# Patient Record
Sex: FEMALE | Race: WHITE | Employment: OTHER | URBAN - METROPOLITAN AREA
[De-identification: names, ages, dates, MRNs, and addresses within clinical notes are randomized per-mention and may not be internally consistent; named-entity substitution may affect disease eponyms.]

---

## 2017-07-26 ENCOUNTER — HOSPITAL ENCOUNTER (OUTPATIENT)
Dept: MAMMOGRAPHY | Age: 72
Discharge: HOME | End: 2017-07-26
Attending: INTERNAL MEDICINE
Payer: MEDICARE

## 2017-07-26 DIAGNOSIS — N63.10 LUMP OF RIGHT BREAST: ICD-10-CM

## 2017-07-26 DIAGNOSIS — N64.59 INVERTED NIPPLE: ICD-10-CM

## 2017-07-26 PROCEDURE — 76642 ULTRASOUND BREAST LIMITED: CPT

## 2017-07-26 PROCEDURE — 77066 DX MAMMO INCL CAD BI: CPT

## 2017-07-27 NOTE — PROGRESS NOTES
Please refer her urgently to Dr. Minnie George for breast biopsy due to 2.5 cm mass in right breast. Thanks.   Jessica Simpson

## 2017-07-31 ENCOUNTER — HOSPITAL ENCOUNTER (OUTPATIENT)
Dept: MAMMOGRAPHY | Age: 72
Discharge: HOME | End: 2017-07-31
Attending: INTERNAL MEDICINE
Payer: MEDICARE

## 2017-07-31 VITALS — DIASTOLIC BLOOD PRESSURE: 81 MMHG | HEART RATE: 80 BPM | SYSTOLIC BLOOD PRESSURE: 175 MMHG | TEMPERATURE: 96.5 F

## 2017-07-31 DIAGNOSIS — N63.10 BREAST MASS, RIGHT: ICD-10-CM

## 2017-07-31 PROCEDURE — 88374 M/PHMTRC ALYS ISHQUANT/SEMIQ: CPT

## 2017-07-31 PROCEDURE — 77065 DX MAMMO INCL CAD UNI: CPT

## 2017-07-31 PROCEDURE — 74011000250 HC RX REV CODE- 250: Performed by: INTERNAL MEDICINE

## 2017-07-31 PROCEDURE — 88305 TISSUE EXAM BY PATHOLOGIST: CPT | Performed by: INTERNAL MEDICINE

## 2017-07-31 PROCEDURE — 73060999999 HC MISC LAB CHARGE

## 2017-07-31 PROCEDURE — 19083 BX BREAST 1ST LESION US IMAG: CPT

## 2017-07-31 PROCEDURE — 88361 TUMOR IMMUNOHISTOCHEM/COMPUT: CPT

## 2017-07-31 RX ORDER — LIDOCAINE HYDROCHLORIDE 10 MG/ML
5 INJECTION INFILTRATION; PERINEURAL
Status: COMPLETED | OUTPATIENT
Start: 2017-07-31 | End: 2017-07-31

## 2017-07-31 RX ADMIN — LIDOCAINE HYDROCHLORIDE 5 ML: 10 INJECTION, SOLUTION INFILTRATION; PERINEURAL at 09:05

## 2017-08-02 ENCOUNTER — HOSPITAL ENCOUNTER (OUTPATIENT)
Dept: MRI IMAGING | Age: 72
Discharge: HOME | End: 2017-08-02
Attending: INTERNAL MEDICINE
Payer: MEDICARE

## 2017-08-02 DIAGNOSIS — C50.911 BREAST CANCER, RIGHT (HCC): ICD-10-CM

## 2017-08-02 PROCEDURE — 74011250636 HC RX REV CODE- 250/636: Performed by: INTERNAL MEDICINE

## 2017-08-02 PROCEDURE — 77059 MRI BREAST BI W WO CONT: CPT

## 2017-08-02 PROCEDURE — A9577 INJ MULTIHANCE: HCPCS | Performed by: INTERNAL MEDICINE

## 2017-08-02 RX ORDER — SODIUM CHLORIDE 0.9 % (FLUSH) 0.9 %
10 SYRINGE (ML) INJECTION
Status: COMPLETED | OUTPATIENT
Start: 2017-08-02 | End: 2017-08-02

## 2017-08-02 RX ADMIN — GADOBENATE DIMEGLUMINE 20 ML: 529 INJECTION, SOLUTION INTRAVENOUS at 13:00

## 2017-08-02 RX ADMIN — Medication 10 ML: at 13:00

## 2017-08-03 NOTE — PROGRESS NOTES
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 Baljit Thomas and I discussed the results and her next steps with the pt. She is having a bilateral breast MRI at 1230 and then will follow up with Dr. Aris Lopes on 2/4/15 at [id number]. The patient was very pleasant and had a lot of questions, she has a family member with a history of breast cancer so she dicussed the reasons for lumpectomy vs mastectomy more than usual. I assured her that the surgeon would go over those more in detail with her once she had her MRI results and receptors were back. The pt was given a new breast cancer pt packet of information that includes educational, emotional and spiritual support. The patient was given my contact information in case she has any further questions.

## 2017-08-09 PROBLEM — C50.511 BREAST CANCER OF LOWER-OUTER QUADRANT OF RIGHT FEMALE BREAST (HCC): Status: ACTIVE | Noted: 2017-08-09

## 2017-08-15 RX ORDER — CEFAZOLIN SODIUM IN 0.9 % NACL 2 G/50 ML
2 INTRAVENOUS SOLUTION, PIGGYBACK (ML) INTRAVENOUS ONCE
Status: CANCELLED | OUTPATIENT
Start: 2017-08-25 | End: 2017-08-25

## 2017-08-17 ENCOUNTER — HOSPITAL ENCOUNTER (OUTPATIENT)
Dept: SURGERY | Age: 72
Discharge: HOME | End: 2017-08-17
Attending: SURGERY

## 2017-08-17 VITALS
HEIGHT: 64 IN | BODY MASS INDEX: 41.48 KG/M2 | WEIGHT: 243 LBS | SYSTOLIC BLOOD PRESSURE: 169 MMHG | OXYGEN SATURATION: 94 % | TEMPERATURE: 96.6 F | DIASTOLIC BLOOD PRESSURE: 78 MMHG | HEART RATE: 66 BPM

## 2017-08-17 RX ORDER — GLUCOSAMINE/CHONDR SU A SOD 750-600 MG
1 TABLET ORAL DAILY
COMMUNITY
End: 2018-02-02 | Stop reason: ALTCHOICE

## 2017-08-18 RX ORDER — ASPIRIN 81 MG
1 TABLET, DELAYED RELEASE (ENTERIC COATED) ORAL
COMMUNITY

## 2017-08-24 ENCOUNTER — ANESTHESIA EVENT (OUTPATIENT)
Dept: SURGERY | Age: 72
End: 2017-08-24
Payer: MEDICARE

## 2017-08-25 ENCOUNTER — APPOINTMENT (OUTPATIENT)
Dept: MAMMOGRAPHY | Age: 72
End: 2017-08-25
Attending: SURGERY
Payer: MEDICARE

## 2017-08-25 ENCOUNTER — APPOINTMENT (OUTPATIENT)
Dept: NUCLEAR MEDICINE | Age: 72
End: 2017-08-25
Attending: SURGERY
Payer: MEDICARE

## 2017-08-25 ENCOUNTER — ANESTHESIA (OUTPATIENT)
Dept: SURGERY | Age: 72
End: 2017-08-25
Payer: MEDICARE

## 2017-08-25 ENCOUNTER — HOSPITAL ENCOUNTER (OUTPATIENT)
Age: 72
Setting detail: OUTPATIENT SURGERY
Discharge: HOME | End: 2017-08-25
Attending: SURGERY | Admitting: SURGERY
Payer: MEDICARE

## 2017-08-25 VITALS
WEIGHT: 243 LBS | RESPIRATION RATE: 16 BRPM | SYSTOLIC BLOOD PRESSURE: 145 MMHG | HEIGHT: 64 IN | TEMPERATURE: 97.4 F | BODY MASS INDEX: 41.48 KG/M2 | DIASTOLIC BLOOD PRESSURE: 68 MMHG | OXYGEN SATURATION: 94 % | HEART RATE: 90 BPM

## 2017-08-25 DIAGNOSIS — N63.10 BREAST MASS, RIGHT: ICD-10-CM

## 2017-08-25 DIAGNOSIS — C50.511 BREAST CANCER OF LOWER-OUTER QUADRANT OF RIGHT FEMALE BREAST (HCC): ICD-10-CM

## 2017-08-25 PROCEDURE — 76060000034 HC ANESTHESIA 1.5 TO 2 HR: Performed by: SURGERY

## 2017-08-25 PROCEDURE — 77030008703 HC TU ET UNCUF COVD -A

## 2017-08-25 PROCEDURE — 77030031139 HC SUT VCRL2 J&J -A: Performed by: SURGERY

## 2017-08-25 PROCEDURE — 74011250636 HC RX REV CODE- 250/636: Performed by: ANESTHESIOLOGY

## 2017-08-25 PROCEDURE — 77030003460 US PLC NDL/WIRE/CLIP/SEED BREAST RT

## 2017-08-25 PROCEDURE — 74011250636 HC RX REV CODE- 250/636

## 2017-08-25 PROCEDURE — 77065 DX MAMMO INCL CAD UNI: CPT

## 2017-08-25 PROCEDURE — 77030002933 HC SUT MCRYL J&J -A: Performed by: SURGERY

## 2017-08-25 PROCEDURE — 74011000250 HC RX REV CODE- 250: Performed by: SURGERY

## 2017-08-25 PROCEDURE — 76010000153 HC OR TIME 1.5 TO 2 HR: Performed by: SURGERY

## 2017-08-25 PROCEDURE — 74011000250 HC RX REV CODE- 250

## 2017-08-25 PROCEDURE — 74011250637 HC RX REV CODE- 250/637: Performed by: ANESTHESIOLOGY

## 2017-08-25 PROCEDURE — 77030018836 HC SOL IRR NACL ICUM -A: Performed by: SURGERY

## 2017-08-25 PROCEDURE — 76210000016 HC OR PH I REC 1 TO 1.5 HR: Performed by: SURGERY

## 2017-08-25 PROCEDURE — 77030019908 HC STETH ESOPH SIMS -A

## 2017-08-25 PROCEDURE — 88374 M/PHMTRC ALYS ISHQUANT/SEMIQ: CPT

## 2017-08-25 PROCEDURE — 77030002996 HC SUT SLK J&J -A: Performed by: SURGERY

## 2017-08-25 PROCEDURE — 74011250636 HC RX REV CODE- 250/636: Performed by: SURGERY

## 2017-08-25 PROCEDURE — 77030011265 HC ELECTRD BLD HEX COVD -A: Performed by: SURGERY

## 2017-08-25 PROCEDURE — 77030010514 HC APPL CLP LIG COVD -B: Performed by: SURGERY

## 2017-08-25 PROCEDURE — 88305 TISSUE EXAM BY PATHOLOGIST: CPT | Performed by: SURGERY

## 2017-08-25 PROCEDURE — A9541 TC99M SULFUR COLLOID: HCPCS

## 2017-08-25 PROCEDURE — 76210000020 HC REC RM PH II FIRST 0.5 HR: Performed by: SURGERY

## 2017-08-25 PROCEDURE — 77030010938 HC CLP LIG TELE -A: Performed by: SURGERY

## 2017-08-25 PROCEDURE — 88307 TISSUE EXAM BY PATHOLOGIST: CPT | Performed by: SURGERY

## 2017-08-25 PROCEDURE — 77030016570 HC BLNKT BAIR HGGR 3M -B

## 2017-08-25 PROCEDURE — 77030008477 HC STYL SATN SLP COVD -A

## 2017-08-25 PROCEDURE — 77030011640 HC PAD GRND REM COVD -A: Performed by: SURGERY

## 2017-08-25 RX ORDER — LIDOCAINE HYDROCHLORIDE 10 MG/ML
6 INJECTION INFILTRATION; PERINEURAL
Status: COMPLETED | OUTPATIENT
Start: 2017-08-25 | End: 2017-08-25

## 2017-08-25 RX ORDER — HYDRALAZINE HYDROCHLORIDE 20 MG/ML
INJECTION INTRAMUSCULAR; INTRAVENOUS AS NEEDED
Status: DISCONTINUED | OUTPATIENT
Start: 2017-08-25 | End: 2017-08-25 | Stop reason: HOSPADM

## 2017-08-25 RX ORDER — PROMETHAZINE HYDROCHLORIDE 25 MG/1
25 TABLET ORAL
Qty: 20 TAB | Refills: 0 | Status: SHIPPED | OUTPATIENT
Start: 2017-08-25 | End: 2018-02-02 | Stop reason: ALTCHOICE

## 2017-08-25 RX ORDER — SODIUM CHLORIDE, SODIUM LACTATE, POTASSIUM CHLORIDE, CALCIUM CHLORIDE 600; 310; 30; 20 MG/100ML; MG/100ML; MG/100ML; MG/100ML
75 INJECTION, SOLUTION INTRAVENOUS CONTINUOUS
Status: DISCONTINUED | OUTPATIENT
Start: 2017-08-25 | End: 2017-08-25 | Stop reason: HOSPADM

## 2017-08-25 RX ORDER — LIDOCAINE HYDROCHLORIDE 10 MG/ML
0.1 INJECTION INFILTRATION; PERINEURAL AS NEEDED
Status: DISCONTINUED | OUTPATIENT
Start: 2017-08-25 | End: 2017-08-25 | Stop reason: HOSPADM

## 2017-08-25 RX ORDER — LIDOCAINE HYDROCHLORIDE 10 MG/ML
INJECTION INFILTRATION; PERINEURAL AS NEEDED
Status: DISCONTINUED | OUTPATIENT
Start: 2017-08-25 | End: 2017-08-25 | Stop reason: HOSPADM

## 2017-08-25 RX ORDER — OXYCODONE HYDROCHLORIDE 5 MG/1
10 TABLET ORAL
Status: DISCONTINUED | OUTPATIENT
Start: 2017-08-25 | End: 2017-08-25 | Stop reason: HOSPADM

## 2017-08-25 RX ORDER — CEFAZOLIN SODIUM IN 0.9 % NACL 2 G/50 ML
2 INTRAVENOUS SOLUTION, PIGGYBACK (ML) INTRAVENOUS ONCE
Status: COMPLETED | OUTPATIENT
Start: 2017-08-25 | End: 2017-08-25

## 2017-08-25 RX ORDER — FENTANYL CITRATE 50 UG/ML
100 INJECTION, SOLUTION INTRAMUSCULAR; INTRAVENOUS ONCE
Status: DISCONTINUED | OUTPATIENT
Start: 2017-08-25 | End: 2017-08-25 | Stop reason: HOSPADM

## 2017-08-25 RX ORDER — SUCCINYLCHOLINE CHLORIDE 20 MG/ML
INJECTION INTRAMUSCULAR; INTRAVENOUS AS NEEDED
Status: DISCONTINUED | OUTPATIENT
Start: 2017-08-25 | End: 2017-08-25 | Stop reason: HOSPADM

## 2017-08-25 RX ORDER — GLYCOPYRROLATE 0.2 MG/ML
INJECTION INTRAMUSCULAR; INTRAVENOUS AS NEEDED
Status: DISCONTINUED | OUTPATIENT
Start: 2017-08-25 | End: 2017-08-25 | Stop reason: HOSPADM

## 2017-08-25 RX ORDER — MIDAZOLAM HYDROCHLORIDE 1 MG/ML
2 INJECTION, SOLUTION INTRAMUSCULAR; INTRAVENOUS ONCE
Status: COMPLETED | OUTPATIENT
Start: 2017-08-25 | End: 2017-08-25

## 2017-08-25 RX ORDER — OXYCODONE HYDROCHLORIDE 5 MG/1
5 TABLET ORAL
Status: DISCONTINUED | OUTPATIENT
Start: 2017-08-25 | End: 2017-08-25 | Stop reason: HOSPADM

## 2017-08-25 RX ORDER — OXYCODONE AND ACETAMINOPHEN 5; 325 MG/1; MG/1
TABLET ORAL
Qty: 24 TAB | Refills: 0 | Status: SHIPPED | OUTPATIENT
Start: 2017-08-25 | End: 2017-09-06

## 2017-08-25 RX ORDER — MIDAZOLAM HYDROCHLORIDE 1 MG/ML
2 INJECTION, SOLUTION INTRAMUSCULAR; INTRAVENOUS ONCE
Status: DISCONTINUED | OUTPATIENT
Start: 2017-08-25 | End: 2017-08-25 | Stop reason: HOSPADM

## 2017-08-25 RX ORDER — PROPOFOL 10 MG/ML
INJECTION, EMULSION INTRAVENOUS AS NEEDED
Status: DISCONTINUED | OUTPATIENT
Start: 2017-08-25 | End: 2017-08-25 | Stop reason: HOSPADM

## 2017-08-25 RX ORDER — LIDOCAINE HYDROCHLORIDE 20 MG/ML
INJECTION, SOLUTION EPIDURAL; INFILTRATION; INTRACAUDAL; PERINEURAL AS NEEDED
Status: DISCONTINUED | OUTPATIENT
Start: 2017-08-25 | End: 2017-08-25 | Stop reason: HOSPADM

## 2017-08-25 RX ORDER — FAMOTIDINE 20 MG/1
20 TABLET, FILM COATED ORAL ONCE
Status: COMPLETED | OUTPATIENT
Start: 2017-08-25 | End: 2017-08-25

## 2017-08-25 RX ORDER — HYDROMORPHONE HYDROCHLORIDE 2 MG/ML
0.5 INJECTION, SOLUTION INTRAMUSCULAR; INTRAVENOUS; SUBCUTANEOUS
Status: DISCONTINUED | OUTPATIENT
Start: 2017-08-25 | End: 2017-08-25 | Stop reason: HOSPADM

## 2017-08-25 RX ORDER — ROCURONIUM BROMIDE 10 MG/ML
INJECTION, SOLUTION INTRAVENOUS AS NEEDED
Status: DISCONTINUED | OUTPATIENT
Start: 2017-08-25 | End: 2017-08-25 | Stop reason: HOSPADM

## 2017-08-25 RX ORDER — FENTANYL CITRATE 50 UG/ML
INJECTION, SOLUTION INTRAMUSCULAR; INTRAVENOUS AS NEEDED
Status: DISCONTINUED | OUTPATIENT
Start: 2017-08-25 | End: 2017-08-25 | Stop reason: HOSPADM

## 2017-08-25 RX ADMIN — LIDOCAINE HYDROCHLORIDE 100 MG: 20 INJECTION, SOLUTION EPIDURAL; INFILTRATION; INTRACAUDAL; PERINEURAL at 12:11

## 2017-08-25 RX ADMIN — PROPOFOL 150 MG: 10 INJECTION, EMULSION INTRAVENOUS at 12:11

## 2017-08-25 RX ADMIN — FAMOTIDINE 20 MG: 20 TABLET ORAL at 10:00

## 2017-08-25 RX ADMIN — SUCCINYLCHOLINE CHLORIDE 160 MG: 20 INJECTION INTRAMUSCULAR; INTRAVENOUS at 12:11

## 2017-08-25 RX ADMIN — GLYCOPYRROLATE 0.2 MG: 0.2 INJECTION INTRAMUSCULAR; INTRAVENOUS at 12:35

## 2017-08-25 RX ADMIN — MIDAZOLAM HYDROCHLORIDE 2 MG: 1 INJECTION, SOLUTION INTRAMUSCULAR; INTRAVENOUS at 11:43

## 2017-08-25 RX ADMIN — FENTANYL CITRATE 25 MCG: 50 INJECTION, SOLUTION INTRAMUSCULAR; INTRAVENOUS at 13:14

## 2017-08-25 RX ADMIN — GLYCOPYRROLATE 0.2 MG: 0.2 INJECTION INTRAMUSCULAR; INTRAVENOUS at 12:38

## 2017-08-25 RX ADMIN — LIDOCAINE HYDROCHLORIDE 6 ML: 10 INJECTION, SOLUTION INFILTRATION; PERINEURAL at 10:50

## 2017-08-25 RX ADMIN — SODIUM CHLORIDE, SODIUM LACTATE, POTASSIUM CHLORIDE, AND CALCIUM CHLORIDE 75 ML/HR: 600; 310; 30; 20 INJECTION, SOLUTION INTRAVENOUS at 11:43

## 2017-08-25 RX ADMIN — FENTANYL CITRATE 100 MCG: 50 INJECTION, SOLUTION INTRAMUSCULAR; INTRAVENOUS at 12:07

## 2017-08-25 RX ADMIN — CEFAZOLIN 2 G: 1 INJECTION, POWDER, FOR SOLUTION INTRAMUSCULAR; INTRAVENOUS; PARENTERAL at 12:06

## 2017-08-25 RX ADMIN — ROCURONIUM BROMIDE 5 MG: 10 INJECTION, SOLUTION INTRAVENOUS at 12:11

## 2017-08-25 RX ADMIN — HYDRALAZINE HYDROCHLORIDE 10 MG: 20 INJECTION INTRAMUSCULAR; INTRAVENOUS at 13:35

## 2017-08-25 NOTE — ANESTHESIA POSTPROCEDURE EVALUATION
Post-Anesthesia Evaluation and Assessment    Patient: Linsey Trinidad MRN: [id number]  SSN: [id number]    YOB: 1945  Age: 67 y.o. Sex: female       Cardiovascular Function/Vital Signs  Visit Vitals    /77    Pulse 95    Temp 36.3 °C (97.4 °F)    Resp 16    Ht 5' 3.5\" (1.613 m)    Wt 110.2 kg (243 lb)    SpO2 91%    BMI 42.37 kg/m2       Patient is status post general anesthesia for Procedure(s):  RIGHT BREAST LUMPECTOMY  RIGHT BREAST NEEDLE LOCALIZED BIOPSY  SENTINEL NODE BIOPSY WITH LYMPHATIC MAPPING. Nausea/Vomiting: None    Postoperative hydration reviewed and adequate. Pain:  Pain Scale 1: Visual (08/25/17 1416)  Pain Intensity 1: 0 (08/25/17 1416)   Managed    Neurological Status:   Neuro (WDL): Within Defined Limits (08/25/17 1416)  Neuro  Neurologic State: Alert (08/25/17 1416)  Orientation Level: Oriented to person;Oriented to place (08/25/17 1416)  LUE Motor Response: Purposeful (08/25/17 1416)  LLE Motor Response: Purposeful (08/25/17 1416)  RUE Motor Response: Purposeful (08/25/17 1416)  RLE Motor Response: Purposeful (08/25/17 1416)   At baseline    Mental Status and Level of Consciousness: Arousable    Pulmonary Status:   O2 Device: Room air (08/25/17 1432)   Adequate oxygenation and airway patent    Complications related to anesthesia: None    Post-anesthesia assessment completed.  No concerns    Signed By: Dale Jackson MD     August 25, 2017

## 2017-08-25 NOTE — OP NOTES
[facility] 64   OPERATIVE REPORT       Name:  Everton Perez   MR#:  [id number]   :  1945   Account #:  [de-identified]   Date of Adm:  2017       DATE OF SURGERY: 2017    PREPROCEDURE DIAGNOSIS: Right breast cancer    POSTOPERATIVE DIAGNOSIS: Right breast cancer    PROCEDURES:    1. Right breast needle localized lumpectomy. 2. Right axillary sentinel lymph node biopsy with lymphatic   mapping. SURGEON: Lynda Perez MD.    ASSISTANT: None. ANESTHESIA: General anesthetic. ESTIMATED BLOOD LOSS: 25 mL. CONDITION AT COMPLETION: Stable. INDICATIONS: This patient is a 65-year-old white female with a   palpable mass at the lateral aspect of the nipple areolar   complex on the right side. The patient had complete workup   showing an approximately 2.5 cm mass with no evidence of   metastatic disease spread to the contralateral breast or   axillary node involvement. Risks, benefits, and alternatives to   surgical options were all clearly discussed with the patient. She understood the risks and wished to proceed and appropriate   consent was given. PROCEDURE: The patient underwent wire localization of the mass   prior to going to the operating room. She was then taken to the   OR and placed on the table in supine position. She underwent   general anesthetic with no difficulty. The breast was prepped   and draped in sterile fashion. IV antibiotics were given upon   induction. A time-out was performed identifying the patient,   procedure to be performed. The mass was palpable at approximately the 7 o'clock position   beneath the areola. An elliptical skin incision was made with a   #15 blade that included the majority of the nipple areolar   complex, along with the specimen. Electrocautery Bovie was then   used to take a generous specimen around the palpable mass at   least 1 cm margins in all directions.  Once removed, the specimen   was marked for orientation with a long suture lateral, short   suture superior, and a double suture in the deep margins. The   cavity was irrigated with warm saline and all fluid was   suctioned away. Hemostasis was assured. The incision was closed   in 2 layers with deep interrupted 3-0 Vicryl and subcuticular 4-  0 Monocryl. Mastisol and Steri-Strips were applied. Attention was turned to the axilla where the NeoProbe was used   to localize the sentinel lymph node. A small incision was made   directly overlying the increased signal from the probe and dissection down to it was performed   with both blunt and electrocautery dissection. The node was   identified and removed in its entirety and sent to Pathology for   review. No other nodes were found with increased uptake after   the removal. Again, the cavity was irrigated and closed in 2   layers with 3-0 Vicryl and 4-0 subcu Monocryl. Steri-Strips and   gauze dressings were applied. The patient tolerated the procedures well with no complications. She was awakened, extubated, and taken to recovery in good   condition.         MD Kenya Sellers / [place]   D:  08/25/2017   14:59   T:  08/25/2017   15:47   Job #:  [id number]

## 2017-08-25 NOTE — PERIOP NOTES
Patient unable to remove left ring finger ring, informed of risks including burn, loss of finger and other risks involved. Dr. Miguel Alpha informed of increased B/P of 204/105, no new orders given.

## 2017-08-25 NOTE — ANESTHESIA PREPROCEDURE EVALUATION
Anesthetic History     PONV          Review of Systems / Medical History      Pulmonary        Sleep apnea: CPAP           Neuro/Psych              Cardiovascular    Hypertension              Exercise tolerance: >4 METS     GI/Hepatic/Renal     GERD           Endo/Other        Morbid obesity     Other Findings              Physical Exam    Airway  Mallampati: II  TM Distance: > 6 cm  Neck ROM: normal range of motion   Mouth opening: Normal     Cardiovascular  Regular rate and rhythm,  S1 and S2 normal,  no murmur, click, rub, or gallop  Rhythm: regular  Rate: normal         Dental  No notable dental hx       Pulmonary  Breath sounds clear to auscultation               Abdominal         Other Findings            Anesthetic Plan    ASA: 3  Anesthesia type: general            Anesthetic plan and risks discussed with: Patient      AIMEE

## 2017-08-25 NOTE — IP AVS SNAPSHOT
[facility] 
 
 
 [address] [address] 
[phone] Patient: Roxi Figueredo MRN: [id number] ZDO:6/36/3119 You are allergic to the following Allergen Reactions Latex Rash Blisters Recent Documentation Height Weight BMI OB Status Smoking Status 1.613 m 110.2 kg 42.37 kg/m2 Hysterectomy Never Smoker Emergency Contacts Name Discharge Info Relation Home Work Mobile Koki Ramirez DISCHARGE CAREGIVER [3] Spouse [3]   [phone] About your hospitalization You were admitted on:  August 25, 2017 You last received care in the:  [facility] PACU You were discharged on:  August 25, 2017 Unit phone number:  [phone] Why you were hospitalized Your primary diagnosis was:  Not on File Providers Seen During Your Hospitalizations Provider Role Specialty Primary office phone Jayla Jon MD Attending Provider General Surgery [phone] Your Primary Care Physician (PCP) Primary Care Physician Office Phone Office Fax Ben Mater [address] [facility] Follow-up Information Follow up With Details Comments Contact Info Angelica Cohen, [address] [facility] [place] [facility] [address] 
[phone] Jayla Jon MD Schedule an appointment as soon as possible for a visit in 10 day(s)  [place] [address] [place] [address] [phone] [place] [address] 
[phone] Current Discharge Medication List  
  
START taking these medications Dose & Instructions Dispensing Information Comments Morning Noon Evening Bedtime  
 oxyCODONE-acetaminophen 5-325 mg per tablet Commonly known as:  PERCOCET Your last dose was: Your next dose is:    
   
   
 1-2 tabs by mouth every four hours prn pain Quantity:  24 Tab Refills:  0  
     
   
   
   
  
 promethazine 25 mg tablet Commonly known as:  PHENERGAN  
   
 Your last dose was: Your next dose is:    
   
   
 Dose:  25 mg Take 1 Tab by mouth every six (6) hours as needed for Nausea. Quantity:  20 Tab Refills:  0 CONTINUE these medications which have NOT CHANGED Dose & Instructions Dispensing Information Comments Morning Noon Evening Bedtime  
 aspirin 325 mg tablet Commonly known as:  ASPIRIN Your last dose was: Your next dose is:    
   
   
 Dose:  325 mg Take 325 mg by mouth as needed for Pain. Refills:  0 Biotin 2,500 mcg Cap Your last dose was: Your next dose is:    
   
   
 Dose:  1 Cap Take 1 Cap by mouth daily. Refills:  0  
     
   
   
   
  
 cpap machine kit Your last dose was: Your next dose is:    
   
   
 by Does Not Apply route. 15 cm Refills:  0  
     
   
   
   
  
 cranberry extract 450 mg Tab tablet Your last dose was: Your next dose is:    
   
   
 Dose:  450 mg Take 450 mg by mouth daily. Refills:  0 FLUoxetine 40 mg capsule Commonly known as:  PROzac Your last dose was: Your next dose is:    
   
   
 Dose:  40 mg Take 1 Cap by mouth daily. Quantity:  30 Cap Refills:  11  
     
   
   
   
  
 ibuprofen 200 mg tablet Commonly known as:  MOTRIN Your last dose was: Your next dose is:    
   
   
 Dose:  800 mg Take 800 mg by mouth every eight (8) hours as needed for Pain. Refills:  0  
     
   
   
   
  
 raNITIdine 150 mg tablet Commonly known as:  ZANTAC Your last dose was: Your next dose is:    
   
   
 Dose:  150 mg Take 150 mg by mouth two (2) times a day. Take / use AM day of surgery  per anesthesia protocols. Indications: gastroesophageal reflux disease Refills:  0 STOOL SOFTENER 100 mg Tab Generic drug:  docusate sodium Your last dose was: Your next dose is:    
   
   
 Dose:  1 Tab Take 1 Tab by mouth daily as needed. Indications: constipation Refills:  0 Where to Get Your Medications Information on where to get these meds will be given to you by the nurse or doctor. ! Ask your nurse or doctor about these medications  
  oxyCODONE-acetaminophen 5-325 mg per tablet  
 promethazine 25 mg tablet Discharge Instructions May shower on Sunday Lumpectomy: What to Expect at [facility] Your Recovery For 1 or 2 days after the surgery you will probably feel tired and have some pain. The skin around the cut (incision) may feel firm, swollen, and tender, and be bruised. Tenderness should go away in about 2 or 3 days, and the bruising within 2 weeks. Firmness and swelling may last for 3 to 6 months. You may feel a soft lump in your breast that gradually turns hard. This is the incision healing. It is not cancer. You should wear a well-fitted and supportive bra, even during the night, for 1 week. You will probably be able to go back to work or your normal routine in 1 to 3 weeks after the surgery. This may depend on whether you have more treatment. Your doctor may have removed some lymph nodes in your armpit to see if the cancer has spread. If so, you may feel either numbness or tingling (\"pins and needles\") in your armpit or on the inside of your upper arm. This should improve over the next several weeks. Some women have numbness for a longer time. When you find out that you have cancer, you may feel many emotions and may need some help coping. Seek out family, friends, and counselors for support. You also can do things at home to make yourself feel better while you go through treatment. Call the [employer] (0-741.591.9779) or visit its website at [employer] for more information.  
This care sheet gives you a general idea about how long it will take for you to recover. But each person recovers at a different pace. Follow the steps below to get better as quickly as possible. How can you care for yourself at home? Activity · Rest when you feel tired. Getting enough sleep will help you recover. You may want to sleep on the side that has not been operated on. Use a pillow to support the affected breast while lying on your side. · Avoid strenuous activities, such as biking, jogging, weightlifting, or aerobic exercise, for 1 month or until your doctor says it is okay. This may include housework, such as washing windows, especially if you have to use the arm next to the affected breast. 
· Most women can return to their normal activities within 2 weeks. · Try to walk each day. Start out by walking a little more than you did the day before. Bit by bit, increase the amount you walk. Walking boosts blood flow and helps prevent pneumonia and constipation. · For 1 to 2 weeks, avoid lifting anything over 10 to 15 pounds or that would make you strain. This may include heavy grocery bags and milk containers, a heavy briefcase or backpack, cat litter or dog food bags, a vacuum , or a child. · You may drive when you are no longer taking pain medicine and can use your arm without pain. Talk to your doctor about when to start driving, especially if you are having radiation treatments. · You will probably be able to go back to work or your normal routine in 1 to 3 weeks. It may be longer, depending on the type of work you do and whether you are having radiation or chemotherapy. · You may shower 24 to 48 hours after surgery, if your doctor okays it. Pat the incision dry. Do not take a bath for the first 2 weeks, or until your doctor tells you it is okay. Diet · You can eat your normal diet. If your stomach is upset, try bland, low-fat foods like plain rice, broiled chicken, toast, and yogurt.  
· You may notice that your bowel movements are not regular right after your surgery. This is common. Try to avoid constipation and straining with bowel movements. You may want to take a fiber supplement every day. If you have not had a bowel movement after a couple of days, ask your doctor about taking a mild laxative. Medicines · Your doctor will tell you if and when you can restart your medicines. He or she will also give you instructions about taking any new medicines. · If you take blood thinners, such as warfarin (Coumadin), clopidogrel (Plavix), or aspirin, be sure to talk to your doctor. He or she will tell you if and when to start taking those medicines again. Make sure that you understand exactly what your doctor wants you to do. · Take pain medicines exactly as directed. ¨ Your doctor may have given you a medicine to numb the area inside and around your cut (incision). The numbness will last from 6 to 12 hours. If you went home right after the surgery, you may want to take pain medicine before this wears off. ¨ If the doctor gave you a prescription medicine for pain, take it as prescribed. ¨ If you are not taking a prescription pain medicine, ask your doctor if you can take an over-the-counter medicine. · If your doctor prescribed antibiotics, take them as directed. Do not stop taking them just because you feel better. You need to take the full course of antibiotics. · If you think your pain medicine is making you sick to your stomach: 
¨ Take your medicine after meals (unless your doctor has told you not to). ¨ Ask your doctor for a different pain medicine. Incision care · If you have strips of tape on the cut the doctor made (incision), leave the tape on for a week or until it falls off. · When you can shower, wash the area daily with warm, soapy water and pat it dry. Follow-up care is a key part of your treatment and safety.  Be sure to make and go to all appointments, and call your doctor if you are having problems. It's also a good idea to know your test results and keep a list of the medicines you take. When should you call for help? Call 911 anytime you think you may need emergency care. For example, call if: 
· You passed out (lost consciousness). · You have severe trouble breathing. · You have sudden chest pain and shortness of breath, or you cough up blood. Call your doctor now or seek immediate medical care if: 
· You have pain that does not get better when you take your pain medicine. · You have signs of infection, such as: 
¨ Increased pain, swelling, warmth, or redness. ¨ Red streaks leading from the incision. ¨ Pus draining from the incision. ¨ A fever. · You have loose stitches or an open incision. · You have sudden swelling of your arm, hands, or fingers. · Bright red blood has soaked through the bandage over your incision. Watch closely for changes in your health, and be sure to contact your doctor if: 
· You see fluid leaking from either nipple. · Your swelling gets worse. · Your swelling is not going down. · You do not have a bowel movement after taking a laxative. Where can you learn more? Go to http://jorge l-eligio.info/. Enter D222 in the search box to learn more about \"Lumpectomy: What to Expect at Home. \" Current as of: September 22, 2016 Content Version: 11.3 © 0729-6123 [employer]. Care instructions adapted under license by [employer] (which disclaims liability or warranty for this information). If you have questions about a medical condition or this instruction, always ask your healthcare professional. [place] [address] any warranty or liability for your use of this information. Discharge Orders None ACO Transitions of Care Introducing [employer] [address] offers a voluntary care coordination program to provide high quality service and care to [facility] fee-for-service beneficiaries. Ke Gordon was designed to help you enhance your health and well-being through the following services: ? Transitions of Care  support for individuals who are transitioning from one care setting to another (example: Hospital to home). ? Chronic and Complex Care Coordination  support for individuals and caregivers of those with serious or chronic illnesses or with more than one chronic (ongoing) condition and those who take a number of different medications. If you meet specific medical criteria, a [facility] [address] may call you directly to coordinate your care with your primary care physician and your other care providers. For questions about the [facility] programs, please, contact your physicians office. For general questions or additional information about Accountable Care Organizations: 
Please visit www.medicare.gov/acos. html or call 1-800-MEDICARE (0-948.921.9934) TTY users should call 5-626.527.9873. Introducing [facility] HE[facility] SERVICES! Donny Teran introduces [email or web address] patient portal. Now you can access parts of your medical record, email your doctor's office, and request medication refills online. 1. In your internet browser, go to [email or web address] 2. Click on the First Time User? Click Here link in the Sign In box. You will see the New Member Sign Up page. 3. Enter your [email or web address] Access Code exactly as it appears below. You will not need to use this code after youve completed the sign-up process. If you do not sign up before the expiration date, you must request a new code. · [email or web address] Access Code: [email or web address] Expires: 10/19/2017  9:11 AM 
 
4. Enter the last four digits of your Social Security Number (xxxx) and Date of Birth (mm/dd/yyyy) as indicated and click Submit. You will be taken to the next sign-up page. 5. Create a [employer] ID. This will be your [employer] login ID and cannot be changed, so think of one that is secure and easy to remember. 6. Create a [employer] password. You can change your password at any time. 7. Enter your Password Reset Question and Answer. This can be used at a later time if you forget your password. 8. Enter your e-mail address. You will receive e-mail notification when new information is available in [address] 9. Click Sign Up. You can now view and download portions of your medical record. 10. Click the Download Summary menu link to download a portable copy of your medical information. If you have questions, please visit the Frequently Asked Questions section of the [employer] website. Remember, [employer] is NOT to be used for urgent needs. For medical emergencies, dial 911. Now available from your iPhone and Android! General Information Please provide this summary of care documentation to your next provider. Patient Signature:  ____________________________________________________________ Date:  ____________________________________________________________  
  
[facility] Provider Signature:  ____________________________________________________________ Date:  ____________________________________________________________

## 2017-08-25 NOTE — DISCHARGE INSTRUCTIONS
May shower on Sunday        Lumpectomy: What to Expect at [facility]    For 1 or 2 days after the surgery you will probably feel tired and have some pain. The skin around the cut (incision) may feel firm, swollen, and tender, and be bruised. Tenderness should go away in about 2 or 3 days, and the bruising within 2 weeks. Firmness and swelling may last for 3 to 6 months. You may feel a soft lump in your breast that gradually turns hard. This is the incision healing. It is not cancer. You should wear a well-fitted and supportive bra, even during the night, for 1 week. You will probably be able to go back to work or your normal routine in 1 to 3 weeks after the surgery. This may depend on whether you have more treatment. Your doctor may have removed some lymph nodes in your armpit to see if the cancer has spread. If so, you may feel either numbness or tingling (\"pins and needles\") in your armpit or on the inside of your upper arm. This should improve over the next several weeks. Some women have numbness for a longer time. When you find out that you have cancer, you may feel many emotions and may need some help coping. Seek out family, friends, and counselors for support. You also can do things at home to make yourself feel better while you go through treatment. Call the [facility] (3-551.275.2681) or visit its website at [employer] for more information. This care sheet gives you a general idea about how long it will take for you to recover. But each person recovers at a different pace. Follow the steps below to get better as quickly as possible. How can you care for yourself at home? Activity  · Rest when you feel tired. Getting enough sleep will help you recover. You may want to sleep on the side that has not been operated on. Use a pillow to support the affected breast while lying on your side.   · Avoid strenuous activities, such as biking, jogging, weightlifting, or aerobic exercise, for 1 month or until your doctor says it is okay. This may include housework, such as washing windows, especially if you have to use the arm next to the affected breast.  · Most women can return to their normal activities within 2 weeks. · Try to walk each day. Start out by walking a little more than you did the day before. Bit by bit, increase the amount you walk. Walking boosts blood flow and helps prevent pneumonia and constipation. · For 1 to 2 weeks, avoid lifting anything over 10 to 15 pounds or that would make you strain. This may include heavy grocery bags and milk containers, a heavy briefcase or backpack, cat litter or dog food bags, a vacuum , or a child. · You may drive when you are no longer taking pain medicine and can use your arm without pain. Talk to your doctor about when to start driving, especially if you are having radiation treatments. · You will probably be able to go back to work or your normal routine in 1 to 3 weeks. It may be longer, depending on the type of work you do and whether you are having radiation or chemotherapy. · You may shower 24 to 48 hours after surgery, if your doctor okays it. Pat the incision dry. Do not take a bath for the first 2 weeks, or until your doctor tells you it is okay. Diet  · You can eat your normal diet. If your stomach is upset, try bland, low-fat foods like plain rice, broiled chicken, toast, and yogurt. · You may notice that your bowel movements are not regular right after your surgery. This is common. Try to avoid constipation and straining with bowel movements. You may want to take a fiber supplement every day. If you have not had a bowel movement after a couple of days, ask your doctor about taking a mild laxative. Medicines  · Your doctor will tell you if and when you can restart your medicines. He or she will also give you instructions about taking any new medicines.   · If you take blood thinners, such as warfarin (Coumadin), clopidogrel (Plavix), or aspirin, be sure to talk to your doctor. He or she will tell you if and when to start taking those medicines again. Make sure that you understand exactly what your doctor wants you to do. · Take pain medicines exactly as directed. ¨ Your doctor may have given you a medicine to numb the area inside and around your cut (incision). The numbness will last from 6 to 12 hours. If you went home right after the surgery, you may want to take pain medicine before this wears off. ¨ If the doctor gave you a prescription medicine for pain, take it as prescribed. ¨ If you are not taking a prescription pain medicine, ask your doctor if you can take an over-the-counter medicine. · If your doctor prescribed antibiotics, take them as directed. Do not stop taking them just because you feel better. You need to take the full course of antibiotics. · If you think your pain medicine is making you sick to your stomach:  ¨ Take your medicine after meals (unless your doctor has told you not to). ¨ Ask your doctor for a different pain medicine. Incision care  · If you have strips of tape on the cut the doctor made (incision), leave the tape on for a week or until it falls off. · When you can shower, wash the area daily with warm, soapy water and pat it dry. Follow-up care is a key part of your treatment and safety. Be sure to make and go to all appointments, and call your doctor if you are having problems. It's also a good idea to know your test results and keep a list of the medicines you take. When should you call for help? Call 911 anytime you think you may need emergency care. For example, call if:  · You passed out (lost consciousness). · You have severe trouble breathing. · You have sudden chest pain and shortness of breath, or you cough up blood. Call your doctor now or seek immediate medical care if:  · You have pain that does not get better when you take your pain medicine.   · You have signs of infection, such as:  ¨ Increased pain, swelling, warmth, or redness. ¨ Red streaks leading from the incision. ¨ Pus draining from the incision. ¨ A fever. · You have loose stitches or an open incision. · You have sudden swelling of your arm, hands, or fingers. · Bright red blood has soaked through the bandage over your incision. Watch closely for changes in your health, and be sure to contact your doctor if:  · You see fluid leaking from either nipple. · Your swelling gets worse. · Your swelling is not going down. · You do not have a bowel movement after taking a laxative. Where can you learn more? Go to http://jorge l-eligio.info/. Enter D222 in the search box to learn more about \"Lumpectomy: What to Expect at Home. \"  Current as of: September 22, 2016  Content Version: 11.3  © 3190-0721 [email or web address]. Care instructions adapted under license by [email or web address] (which disclaims liability or warranty for this information). If you have questions about a medical condition or this instruction, always ask your healthcare professional. [place] [address] any warranty or liability for your use of this information.

## 2017-09-27 ENCOUNTER — PATIENT OUTREACH (OUTPATIENT)
Dept: CASE MANAGEMENT | Age: 72
End: 2017-09-27

## 2017-09-27 NOTE — PROGRESS NOTES
Reason for Referral: Breast cancer of lower-outer quadrant of right female breast     Referring Provider:  Dr. Cisco Rocha      Primary Care Provider:  Kris Yepez MD     Family History of Cancer/Hematologic disorders:  None reported     Presenting Symptoms: One week history of palpable lump under right nipple with skin thickening and nipple inversion and black herminia noted in nipple area six months prior  Narrative with recent with Results/Procedures/Biopsies and Dates completed:  Ms. Zackary Snellen is a 67year old white female with a history of depression, osteoarthritis, MARKY requiring CPAP use, partial hysterectomy, bilateral knee replacement in 2015, left breast biopsy in the 1990s, GERD, diverticulosis and anxiety who was recently diagnosed with breast cancer. She originally presented to her PCP on 7/21/2017 reporting that she had noticed a black herminia in her nipple area approximately six months prior; and one week prior, she felt a palpable lump under her right nipple and noticed skin thickening with nipple inversion. Diagnostic digital bilateral mammogram and bilateral breast ultrasound completed on 7/26/17, identified a right, 9:00, palpable, 2.5 cm mass with nipple retraction, a benign left 3:00 small cyst, and no other suspicious findings on either side.      Recommended ultrasound-guided core biopsy of right 9:00 palpable mass was performed on 7/31/17 with subsequent pathology positive for an ER 99.8%/NJ 96.6%, Her-2 equivocal (2+), invasive ductal carcinoma. MRI, completed on 8/2/17, demonstrated a 2 cm enhancing mass in the anterior right breast at 9:00, posterior extension with a total tumor extent of 2.9 cm, and no additional tumor in either breast.     Ms. Zackary Snellen was evaluated in consultation by Dr. Jovanny Kaba on [birth year]. Surgical treatment options were discussed, and patient was recommended to undergo a lumpectomy and sentinel node biopsy.  Patient decided to proceed with Dr. Orestes Luciano recommendation, and underwent a right breast needle localized lumpectomy and right axillary sentinel lymph node biopsy with lymphatic mapping on 8/25/17. Subsequent pathology from the right breast lumpectomy revealed a low grade (well differentiated), infiltrating duct carcinoma with lobular features, measuring approximately 1.6 x 1.5 x 1.5 cm. An extensive area of lymphovascular invasion was present less than 0.1 cm from marked superior margin; infiltrating tumor was present approximately 0.3 cm from the marked inferior margin; other margins were greater than 1 cm from the tumor; and definite in situ component was not identified. Pathology from the sentinel node indicated macrometastatic carcinoma to one of one lymph nodes with associated extracapsular extension. Infiltrating carcinoma in this specimen was similar to that in prior core biopsy which had estrogen receptors of 99.8%, progesterone receptors of 96.6%, Her-2 (2+) equivocal by IHC and equivocal by FISH. Because of Her-2 studies, FISH for Her-2 was repeated on block A5 and again resulted equivocal.      Completion axillary dissection versus radiation and chemotherapy recommendations were discussed in detail at the multidisciplinary breast conference on 8/31/17. Per consensus recommendation, no further surgery was recommended at this time, and Ms. Forrest Giron is referred to follow up with oncology at [facility] for further treatment options.     DIAGNOSTIC DIGITAL BILATERAL MAMMOGRAPHY AND BILATERAL BREAST ULTRASOUND 7/26/2017  IMPRESSION:   1. Right 9:00 palpable 2.5 cm mass, with nipple retraction. Recommend ultrasound-guided biopsy. 2. No other suspicious finding on either side. Benign left 3:00 small cyst noted. BI-RADS Assessment Category 4c: Suspicious Finding- Biopsy should be considered.     [facility] SURGICAL PATHOLOGY REPORT 7/31/2017  DIAGNOSIS: RIGHT BREAST AT 9 O'CLOCK, 2 CM FROM NIPPLE, ULTRASOUND GUIDED CORE NEEDLE BIOPSY:   INVASIVE DUCTAL CARCINOMA.    ANATOMIC SITE: Right breast at 9 o'clock, 2 cm from nipple. PROCEDURE: Core needle biopsy. HISTOLOGIC TYPE: Ductal carcinoma, NOS. SIZE: At least 1.3 cm in greatest dimension. CHITRA MODIFICATION OF BLOOM-CESPEDES GRADE:   ARCHITECTURAL SCORE: 3/3. NUCLEAR SCORE: 2/3. MITOTIC SCORE: 1/3. TOTAL SCORE: 6/9 = Grade 2/3. IN-SITU COMPONENT: Not identified. LYMPHOVASCULAR INVASION: Not identified. PERINEURAL INVASION: Present. MICROCALCIFICATIONS: Present. COLD ISCHEMIC TIME: Less than 1 minute. TOTAL TIME IN FORMALIN: 11 hours 52 minutes. BLOCK A1 SENT FOR BREAST CARCINOMA PROGNOSTIC INDICATOR ANALYSIS, SEPARATE ADDENDUM REPORT TO FOLLOW. [facility]- ER/NJ/[id number] BY IHC   INTERPRETATION   [employer] IHC Quantitative Breast Panel   TEST NAME:                                                                              RESULTS:                                      INTERNAL CONTROLS:   ESTROGEN RECEPTOR:                                     Positive (99.8%)                    Present, Positive  PROGESTERONE RECEPTOR:                 Positive (96.6%)                    Present, Positive   HER-2/VLADISLAV:                                                                                Equivocal (2+)                                 Percentage of Cells with Uniform   Intense Complete Membrane   Stainin%   FISH Her-2/vladislav has been ordered and will be reported in an addendum.    [facility] - [id number] BY FISH   INTERPRETATION   [employer] [facility] FISH Analysis/HER2 Breast:   RESULTS: Equivocal      MRI OF THE BREASTS 2017  IMPRESSION:   1.  2 cm enhancing mass in the anterior right breast at 9:00, posterior extension with a total tumor extent of 2.9 cm.  2.  No additional tumor in either breast.  BI-RADS Assessment Category 6: Known Biopsy Proven Malignancy     [facility] SURGICAL PATHOLOGY REPORT 2017  DIAGNOSIS   A: RIGHT BREAST LUMPECTOMY: INFILTRATING DUCT CARCINOMA WITH LOBULAR FEATURES, LOW GRADE (WELL DIFFERENTIATED) MEASURING APPROXIMATELY 1.6 X 1.5 X 1.5 CM. EXTENSIVE LYMPHOVASCULAR INVASION. AREA OF LYMPHOVASCULAR INVASION IS PRESENT LESS THAN 0.1 CM FROM MARKED SUPERIOR MARGIN. INFILTRATING TUMOR IS PRESENT APPROXIMATELY 0.3 CM FROM MARKED INFERIOR MARGIN. OTHER MARGINS ARE GREATER THAN 1 CM FROM TUMOR. DEFINITE IN SITU COMPONENT IS NOT IDENTIFIED. SEE COMMENT. B: SENTINEL NODE: MACROMETASTATIC CARCINOMA TO ONE OF ONE LYMPH NODES WITH ASSOCIATED EXTRACAPSULAR EXTENSION. COMMENT: Infiltrating carcinoma in this specimen is similar to that in prior core biopsy [id number] which has estrogen receptors of 100%, progesterone receptors of 97%, Her-2 2+ equivocal by IHC and equivocal by FISH. Because of Her-2 studies, FISH for Her-2 will be repeated on block A5. Estrogen receptors and progesterone receptors can be repeated if clinically indicated. [facility] - [id number] BY FISH   INTERPRETATION   [employer] FISH Analysis/HER2 Breast:   RESULTS: EQUIVOCAL     Notes from referring Provider:  Please evaluate patient for S/P Right Lumpectomy with [place] Node Biopsy for Breast cancer of lower-outer quadrant of right female breast.     Other pertinent information:  None     Presented at Tumor Board: Patient was presented at tumor board on 8/31/17    Copied from new pt abstract note. 9/27/17 saw pt today with Dr. Elaine Pathak for initial medical oncology consult for right breast cancer. Post lumpectomy on 8/25. Sending HER2 for final testing. Briefly discussed treatment - if HER2 positive chemo plus herceptin and if negative will send surgical specimen for mammoprint testing. This testing was discussed with the pt. She does know she will need radiation. Also the possibility of echo and port. Will discuss final plan after all of testing is back. Hormone therapy not discussed. Provided opportunity to ask questions and all were discussed. My contact information was provided and I encouraged her to call with any questions or concerns.   Follow up in 3 weeks. Navigation will continue to follow.

## 2017-10-18 ENCOUNTER — HOSPITAL ENCOUNTER (OUTPATIENT)
Dept: LAB | Age: 72
Discharge: HOME | End: 2017-10-18
Payer: MEDICARE

## 2017-10-18 ENCOUNTER — PATIENT OUTREACH (OUTPATIENT)
Dept: CASE MANAGEMENT | Age: 72
End: 2017-10-18

## 2017-10-18 DIAGNOSIS — C50.511 MALIGNANT NEOPLASM OF LOWER-OUTER QUADRANT OF RIGHT FEMALE BREAST, UNSPECIFIED ESTROGEN RECEPTOR STATUS (HCC): ICD-10-CM

## 2017-10-18 LAB
ALBUMIN SERPL-MCNC: 3.5 G/DL (ref 3.2–4.6)
ALBUMIN/GLOB SERPL: 0.9 {RATIO} (ref 1.2–3.5)
ALP SERPL-CCNC: 98 U/L (ref 50–136)
ALT SERPL-CCNC: 55 U/L (ref 12–65)
ANION GAP SERPL CALC-SCNC: 5 MMOL/L (ref 7–16)
AST SERPL-CCNC: 30 U/L (ref 15–37)
BASOPHILS # BLD: 0.1 K/UL (ref 0–0.2)
BASOPHILS NFR BLD: 1 % (ref 0–2)
BILIRUB SERPL-MCNC: 0.2 MG/DL (ref 0.2–1.1)
BUN SERPL-MCNC: 14 MG/DL (ref 8–23)
CALCIUM SERPL-MCNC: 8.9 MG/DL (ref 8.3–10.4)
CHLORIDE SERPL-SCNC: 107 MMOL/L (ref 98–107)
CO2 SERPL-SCNC: 28 MMOL/L (ref 21–32)
CREAT SERPL-MCNC: 0.69 MG/DL (ref 0.6–1)
DIFFERENTIAL METHOD BLD: ABNORMAL
EOSINOPHIL # BLD: 0.2 K/UL (ref 0–0.8)
EOSINOPHIL NFR BLD: 3 % (ref 0.5–7.8)
ERYTHROCYTE [DISTWIDTH] IN BLOOD BY AUTOMATED COUNT: 14.1 % (ref 11.9–14.6)
GLOBULIN SER CALC-MCNC: 3.8 G/DL (ref 2.3–3.5)
GLUCOSE SERPL-MCNC: 86 MG/DL (ref 65–100)
HCT VFR BLD AUTO: 39.2 % (ref 35.8–46.3)
HGB BLD-MCNC: 13 G/DL (ref 11.7–15.4)
LYMPHOCYTES # BLD: 2 K/UL (ref 0.5–4.6)
LYMPHOCYTES NFR BLD: 31 % (ref 13–44)
MCH RBC QN AUTO: 30.8 PG (ref 26.1–32.9)
MCHC RBC AUTO-ENTMCNC: 33.2 G/DL (ref 31.4–35)
MCV RBC AUTO: 92.9 FL (ref 79.6–97.8)
MONOCYTES # BLD: 0.7 K/UL (ref 0.1–1.3)
MONOCYTES NFR BLD: 10 % (ref 4–12)
NEUTS SEG # BLD: 3.5 K/UL (ref 1.7–8.2)
NEUTS SEG NFR BLD: 55 % (ref 43–78)
NRBC # BLD: 0.01 K/UL (ref 0–0.2)
NRBC BLD-RTO: 0.2 PER 100 WBC (ref 0–2)
PLATELET # BLD AUTO: 230 K/UL (ref 150–450)
PMV BLD AUTO: 10 FL (ref 10.8–14.1)
POTASSIUM SERPL-SCNC: 3.9 MMOL/L (ref 3.5–5.1)
PROT SERPL-MCNC: 7.3 G/DL (ref 6.3–8.2)
RBC # BLD AUTO: 4.22 M/UL (ref 4.05–5.25)
SODIUM SERPL-SCNC: 140 MMOL/L (ref 136–145)
WBC # BLD AUTO: 6.4 K/UL (ref 4.3–11.1)

## 2017-10-18 PROCEDURE — 85025 COMPLETE CBC W/AUTO DIFF WBC: CPT | Performed by: INTERNAL MEDICINE

## 2017-10-18 PROCEDURE — 80053 COMPREHEN METABOLIC PANEL: CPT | Performed by: INTERNAL MEDICINE

## 2017-10-18 PROCEDURE — 36415 COLL VENOUS BLD VENIPUNCTURE: CPT | Performed by: INTERNAL MEDICINE

## 2017-10-18 NOTE — PERIOP NOTES
Patient verified name, , and surgery as listed in [facility]. Type 1B surgery, walk in assessment complete. Labs per surgeon: none needed. Labs per anesthesia protocol: none needed. EKG: not needed per [facility] protocols     Hibiclens and instructions given per hospital policy. Patient provided with and instructed on educational handouts including Guide to Surgery, Pain Management, Hand Hygiene, Blood Transfusion Education, and [place] [facility] Brochure. Patient answered medical/surgical history questions at their best of ability. All prior to admission medications documented in [facility]. Original medication prescription bottles not visualized during patient appointment. Patient instructed to hold all vitamins 7 days prior to surgery and NSAIDS 5 days prior to surgery, patient verbalized understanding. Medications to be held: 325 mg aspirin, biotin, cranberry, ibuprofen. Patient instructed to continue previous medications as prescribed prior to surgery and to take the following medications the day of surgery according to anesthesia guidelines with a small sip of water: fluoxetine, ranitidine. Instructed to bring c-pap dos. Patient teach back successful and patient demonstrates knowledge of instructions.
Acid reflux    Hyperlipidemia    Hypothyroid

## 2017-10-18 NOTE — PROGRESS NOTES
10/18/17 saw pt today with Dr. Ravinder Tomlinson for follow up breast cancer. HER2 did come back negative. Surgical specimen sent for mammoprint to determine role for chemo. She will need radiation and hormone blockers. PO intake is good. She is healing up well from surgery. Provided opportunity to ask questions and all were discussed. Pt will be called with results. Encouraged to call with any concerns. Navigation will continue to follow.

## 2017-11-10 ENCOUNTER — HOSPITAL ENCOUNTER (OUTPATIENT)
Dept: RADIATION ONCOLOGY | Age: 72
Discharge: HOME | End: 2017-11-10
Payer: MEDICARE

## 2017-11-10 VITALS
TEMPERATURE: 98.1 F | RESPIRATION RATE: 18 BRPM | HEART RATE: 72 BPM | DIASTOLIC BLOOD PRESSURE: 89 MMHG | SYSTOLIC BLOOD PRESSURE: 169 MMHG | WEIGHT: 245.4 LBS | BODY MASS INDEX: 42.12 KG/M2 | OXYGEN SATURATION: 94 %

## 2017-11-10 PROCEDURE — 99211 OFF/OP EST MAY X REQ PHY/QHP: CPT

## 2017-11-10 NOTE — CONSULTS
Patient: Morgan Gongora MRN: [id number]  SSN: [id number]    YOB: 1945  Age: 67 y.o. Sex: female      Other Providers:    MD Grady Cuellar MD    CHIEF COMPLAINT: Breast cancer    DIAGNOSIS: Grade 1 IDC of the right breast, ER/WV strongly positive, HER2 equivocal by IHC and FISH (on biopsy and surgical specimen). 1.6 cm resection specimen with 1 of 1 lymph node positive for sentinel node metastases. [id number] (stage IIA)    HISTORY OF PRESENT ILLNESS:  Morgan Gongora is a 67 y.o. female who I am seeing at the request of Dr. Shai Causey regarding the role of radiation therapy in further treatment of this Stage IIA right breast cancer. In 07/17 she was found to have a right breast lump with nipple inversion. She underwent diagnostic mammogram and ultrasound on 07/26/17 that demonstrated  A 2.5 cm mass at the 9 o'clock position of the right breast with nipple retraction. There were no other suspicious findings. Biopsy on 07/31/17 showed low grade invasive ductal carcinoma, ER/WV strongly positive, HER-2 equivocal by IHC and FISH. MRI of the breasts on 08/02/17 showed a 2 cm enhancing mass in the anterior right breast at the 9 o'clock position, posterior extension with a total tumor extent of 2.9 cm. No additional tumor was seen in either breast.  No significant axillary adenopathy was identified. She underwent lumpectomy and sentinel lymph node biopsy under the direction of Dr. Camila Kurtz on 86/41/48. Pathology showed  INFILTRATING DUCT CARCINOMA WITH LOBULAR FEATURES, LOW GRADE (WELL DIFFERENTIATED) MEASURING APPROXIMATELY 1.6 X 1.5 X 1.5 CM. EXTENSIVE LYMPHOVASCULAR INVASION. AREA OF LYMPHOVASCULAR INVASION IS PRESENT LESS THAN 0.1 CM FROM MARKED SUPERIOR MARGIN. INFILTRATING TUMOR IS PRESENT APPROXIMATELY 0.3 CM FROM MARKED INFERIOR MARGIN. OTHER MARGINS ARE GREATER THAN 1 CM FROM TUMOR.  SLN biopsy demonstrated  MACROMETASTATIC CARCINOMA TO ONE OF ONE LYMPH NODES WITH ASSOCIATED EXTRACAPSULAR EXTENSION. Dr. Laxmi Vargas recommended her to testing on the surgical specimen. This returned a negative result. Her case was discussed at breast tumor board. Based on the results of  it was felt that completion axillary dissection was not required. Dr. Laxmi Vargas also recommended MammaPrint testing to document whether she was at low or high risk. If low risk, endocrine therapy alone would be recommended. However, if she was found to be high risk, adjuvant chemotherapy with ddAC-T would be recommended. MammaPrint did return a low-risk result and she was recommended not to undergo chemotherapy. Regardless of these results, radiation therapy was recommended. At this time, Ms. Ginger Maldonado is doing very well. She has recovered nicely from surgery. She has no residual pain. She has no systemic complaints. PAST MEDICAL HISTORY:    Past Medical History:   Diagnosis Date    Anxiety     BMI 40.0-44.9, [facility] ([facility] [address]     Cancer ([facility] [address] 08/2017    right breast     Depression     Diverticulosis     GERD (gastroesophageal reflux disease)     zantac BID    Nausea & vomiting     post-op N/V    Osteoarthritis     Sleep apnea     c-pap       The patient denies history of collagen vascular diseases, pacemaker insertion, prior radiation or prior chemotherapy.      PAST SURGICAL HISTORY:   Past Surgical History:   Procedure Laterality Date    HX BREAST BIOPSY Left 1990's    HX BREAST BIOPSY Right 07/31/2017     Bx    HX BREAST BIOPSY Right 8/25/2017    RIGHT BREAST NEEDLE [facility] [address] [facility] BREAST LUMPECTOMY Right 8/25/2017    RIGHT BRE[facility] [address] [facility] HX KNEE REPLACEMENT Bilateral 2015    HX PARTIAL HYSTERECTOMY      vaginal    HX TUBAL LIGATION  1979       MEDICATIONS:     Current Outpatient Prescriptions:     promethazine (PHENERGAN) 25 mg tablet, Take 1 Tab by mouth every six (6) hours as needed for Nausea., Disp: 20 Tab, Rfl: 0    docusate sodium (STOOL SOFTENER) 100 mg tab, Take 1 Tab by mouth daily as needed. Indications: constipation, Disp: , Rfl:     cranberry extract 450 mg tab tablet, Take 450 mg by mouth daily. , Disp: , Rfl:     Biotin 2,500 mcg cap, Take 1 Cap by mouth daily. , Disp: , Rfl:     FLUoxetine (PROZAC) 40 mg capsule, Take 1 Cap by mouth daily. , Disp: 30 Cap, Rfl: 11    cpap machine kit, by Does Not Apply route. 15 cm, Disp: , Rfl:     ibuprofen (MOTRIN) 200 mg tablet, Take 800 mg by mouth every eight (8) hours as needed for Pain., Disp: , Rfl:     aspirin (ASPIRIN) 325 mg tablet, Take 325 mg by mouth as needed for Pain., Disp: , Rfl:     ranitidine (ZANTAC) 150 mg tablet, Take 150 mg by mouth two (2) times a day. Take / use AM day of surgery  per anesthesia protocols. Indications: gastroesophageal reflux disease, Disp: , Rfl:     ALLERGIES:   Allergies   Allergen Reactions    Latex Rash     Blisters         SOCIAL HISTORY:   Social History     Social History    Marital status:      Spouse name: N/A    Number of children: N/A    Years of education: N/A     Occupational History    retired      Social History Main Topics    Smoking status: Never Smoker    Smokeless tobacco: Never Used    Alcohol use No    Drug use: No    Sexual activity: Not on file     Other Topics Concern    Caffeine Concern Not Asked     3-5 cups per day    Exercise Yes     1-3 days per week     Social History Narrative       FAMILY HISTORY:   Family History   Problem Relation Age of Onset    Depression Mother     Heart Attack Father      before age 61    Alcohol abuse Brother     Asthma Brother     Diabetes Brother     No Known Problems Brother     Breast Cancer Neg Hx        REVIEW OF SYSTEMS: Please see the completed review of systems sheet in the chart that I have reviewed today.       PHYSICAL EXAMINATION:   ECOG Performance status 0-1  VITAL SIGNS:   Visit Vitals    BP 169/89    Pulse 72    Temp 98.1 °F (36.7 °C)    Resp 18    Wt 111.3 kg (245 lb 6.4 oz)    SpO2 94%    BMI 42.12 kg/m2        GENERAL: The patient is well-developed, ambulatory, alert and in no acute distress. HEENT: Head is normocephalic, atraumatic. Pupils are equal, round and reactive to light and accommodation. Extraocular movement intact. Hearing is intact bilaterally to finger rub. Oral cavity reveals no lesions. Mucous membranes are moist. NECK: Neck is supple with no masses. CARDIOVASCULAR: Heart has regular rate and rhythm. There are no murmurs, rubs or gallops. Radial pulses are 2+ RESPIRATORY: Lungs are clear to auscultation and percussion. There is normal respiratory effort. GASTROINTESTINAL: The abdomen is soft, non-tender, nondistended with no hepatospelnomagaly. Digital rectal examination: deferred LYMPHATIC: There is no cervical, supraclavicular or axillary lymphadenopathy bilaterally. MUSCULOSKELETAL: Extremities reveal no cyanosis, clubbing or edema.  is 5+/5. BREASTS: Examination of the unaffected breast reveals no dominant nodules or masses. The lumpectomy cavity appears well healed with good aesthetics and symmetry. NAC is surgically absent. NEURO:  Cranial nerves II-XII grossly intact. Muscular strength and sensation are intact throughout all four extremities. PATHOLOGY:      08/25/17:    DIAGNOSIS   A: RIGHT BREAST LUMPECTOMY: INFILTRATING DUCT CARCINOMA WITH LOBULAR FEATURES, LOW GRADE (WELL DIFFERENTIATED) MEASURING APPROXIMATELY 1.6 X 1.5 X 1.5 CM. EXTENSIVE LYMPHOVASCULAR INVASION. AREA OF LYMPHOVASCULAR INVASION IS PRESENT LESS THAN 0.1 CM FROM MARKED SUPERIOR MARGIN. INFILTRATING TUMOR IS PRESENT APPROXIMATELY 0.3 CM FROM MARKED INFERIOR MARGIN. OTHER MARGINS ARE GREATER THAN 1 CM FROM TUMOR. DEFINITE IN SITU COMPONENT IS NOT IDENTIFIED. SEE COMMENT. B: SENTINEL NODE: MACROMETASTATIC CARCINOMA TO ONE OF ONE LYMPH NODES WITH ASSOCIATED EXTRACAPSULAR EXTENSION. Comment   Infiltrating carcinoma in this specimen is similar to that in prior core biopsy [id number] which has estrogen receptors of 100%, progesterone receptors of 97%, Her-2 2+ equivocal by IHC and equivocal by FISH. Because of Her-2 studies, FISH for Her-2 will be repeated on block A5. Estrogen receptors and progesterone receptors can be repeated if clinically indicated. 17:    DIAGNOSIS   RIGHT BREAST AT 9 O'CLOCK, 2 CM FROM NIPPLE, ULTRASOUND GUIDED CORE NEEDLE BIOPSY:   INVASIVE DUCTAL CARCINOMA. ANATOMIC SITE: Right breast at 9 o'clock, 2 cm from nipple. PROCEDURE: Core needle biopsy. HISTOLOGIC TYPE: Ductal carcinoma, NOS. SIZE: At least 1.3 cm in greatest dimension. CHITRA MODIFICATION OF BLOOM-CESPEDES GRADE:   ARCHITECTURAL SCORE: 3/3. NUCLEAR SCORE: 2/3. MITOTIC SCORE: 1/3. TOTAL SCORE: 6/9 = Grade 2/3. IN-SITU COMPONENT: Not identified. LYMPHOVASCULAR INVASION: Not identified. PERINEURAL INVASION: Present. MICROCALCIFICATIONS: Present. COLD ISCHEMIC TIME: Less than 1 minute. TOTAL TIME IN FORMALIN: 11 hours 52 minutes. BLOCK A1 SENT FOR BREAST CARCINOMA PROGNOSTIC INDICATOR ANALYSIS, SEPARATE ADDENDUM REPORT TO FOLLOW. Procedures/Addenda   STF- ER/WI/[id number] BY IHC   Status: Signed Out Christian Claudio MD on 2017   Interpretation   sones IHC Quantitative Breast Panel   TEST NAME: RESULTS: INTERNAL CONTROLS:   ESTROGEN RECEPTOR: Positive (99.8%) Present, Positive   PROGESTERONE RECEPTOR: Positive (96.6%) Present, Positive   HER-2/VLADISLAV: Equivocal (2+) Percentage of Cells with Uniform   Intense Complete Membrane   Stainin%   FISH Her-2/vladislav has been ordered and will be reported in an addendum. See full report in [facility]. STF - [id number] BY FISH   Status: Signed Out Faraz Pinon MD on 8/15/2017   Interpretation   sones FISH Analysis/HER2 Breast:   RESULTS: Equivocal   See full report in [facility].    STF - [id number] BY FISH   Status: Signed Out Juwan Perez M.D., Ph.D. on 10/5/2017   Interpretation   Re-vinyl HER2 Breast Equivocal FISH Panel Result:   Negative. See full report in [facility]. LABORATORY:   Lab Results   Component Value Date/Time    Sodium 140 10/18/2017 02:55 PM    Potassium 3.9 10/18/2017 02:55 PM    Chloride 107 10/18/2017 02:55 PM    CO2 28 10/18/2017 02:55 PM    Anion gap 5 10/18/2017 02:55 PM    Glucose 86 10/18/2017 02:55 PM    BUN 14 10/18/2017 02:55 PM    Creatinine 0.69 10/18/2017 02:55 PM    GFR est AA >60 10/18/2017 02:55 PM    GFR est non-AA >60 10/18/2017 02:55 PM    Calcium 8.9 10/18/2017 02:55 PM    Albumin 3.5 10/18/2017 02:55 PM    Protein, total 7.3 10/18/2017 02:55 PM    Globulin 3.8 10/18/2017 02:55 PM    A-G Ratio 0.9 10/18/2017 02:55 PM    AST (SGOT) 30 10/18/2017 02:55 PM    ALT (SGPT) 55 10/18/2017 02:55 PM     Lab Results   Component Value Date/Time    WBC 6.4 10/18/2017 02:55 PM    HGB 13.0 10/18/2017 02:55 PM    HCT 39.2 10/18/2017 02:55 PM    PLATELET 293 43/62/1970 02:55 PM       RADIOLOGY:      08/02/17: MRI of the Breasts     INDICATION:  New diagnosis of breast cancer     Standard MRI sequences were obtained through the breasts in multiple planes. Images were obtained before and after intravenous infusion of 20 ml of  Multihance.      FINDINGS:  There is an irregular enhancing 2.2 cm mass at 9:00 in the right breast  consistent with known breast tumor. The mass measures 2.2 cm in diameter. There is a small focus of enhancement immediately posterior to the mass in the  lateral right breast, likely local extension of tumor. Total extent is 2.9 cm. No additional tumor is seen in the right breast.  There is no significant right  axillary adenopathy.     There is mild background enhancement of the left breast.  There is no  significant left axillary adenopathy.   There are no chest wall lesions.     IMPRESSION  IMPRESSION:   1.  2 cm enhancing mass in the anterior right breast at 9:00, posterior  extension with a total tumor extent of 2.9 cm.  2.  No additional tumor in either breast.     BI-RADS Assessment Category 6: Known Biopsy Proven Malignancy      07/26/17: DIAGNOSTIC DIGITAL BILATERAL MAMMOGRAPHY AND BILATERAL BREAST ULTRASOUND      CLINICAL INDICATION: Patient reports approximately one week of right palpable  lump with skin thickening and nipple inversion, remote left benign excision     COMPARISON: 3/23/2009 from [address]:      Mammogram: Digital diagnostic mammography was performed, and is interpreted in  conjunction with a computer assisted detection (CAD) system.       Standard views bilaterally demonstrate scattered glandular densities. Additional  bilateral mediolateral and compression magnification views were performed. On  the right there is an irregular mass in the anterior slightly lateral breast  corresponding to the area of symptoms. On the left there is a small oval  circumscribed mass anteriorly at 3:00 new since prior. Ultrasound is recommended  to further evaluate these areas.     Otherwise there are scattered bilateral typically benign calcifications with no  suspicious cluster. There is no skin thickening or distortion on the left. Circumscribed 1.7 cm fibroadenoma in the right anterior slightly medial breast  is unchanged.     Ultrasound: Real time targeted sonography was performed of the bilateral breast  by the radiologist and sonographer. Corresponding to the mammographic finding on  the left at 3:00 3 cm from the nipple is a 0.9 cm benign cyst. Corresponding to  the area of symptoms on the right at 9:00 2 cm from the nipple is an irregular  heterogeneously hypoechoic 2.5 x 1.7 x 1.4 cm mass with posterior shadowing and  no definite Doppler flow. The right axilla demonstrates no obvious  lymphadenopathy.        IMPRESSION  IMPRESSION:   1. Right 9:00 palpable 2.5 cm mass, with nipple retraction.  Recommend  ultrasound-guided biopsy. 2. No other suspicious finding on either side. Benign left 3:00 small cyst  noted. IMPRESSION:  Evita Monreal is a 67 y.o. female with Grade 1 IDC of the right breast, ER/WI strongly positive, HER2 equivocal by IHC and FISH (on biopsy and surgical specimen). 1.6 cm resection specimen with 1 of 1 lymph node positive for sentinel node metastases. [id number] (stage IIA)    COUNSELING AND COORDINATION OF CARE: I have had an 80 minute consultation with Mrs. Samuel Last of which greater than half has been spent counseling her about management options for her stage II and a right breast cancer. The natural history of breast cancer was reviewed with the patient. Prognostic features including stage, performance status and presence or absence of lymph node involvement were reviewed with the patient. Various treatment options including lumpectomy alone, breast conservation therapy, and mastectomy were compared and contrasted with regard to outcome and quality of life. We discussed the data in support of breast conservation therapy, specifically NSABP B-06, which compared mastectomy to lumpectomy plus or minus radiation. This showed no difference in overall survival but improved local regional control with adjuvant radiation. This has become the standard for patient's wishing to conserve the breast.  Subsequent trials have evaluated the role of boost following an initial course and again showed improved control. We therefore typically recommend 50 gray to the breast followed by a 10 Gray boost to the lumpectomy cavity. There is now also long-term data in support of hypofractionation which has come out of [address] [place] [address] where 3-4 weeks of radiation was compared to the conventional treatment and to date has shown equivalent tumor control and cosmetic outcomes.   This is an option for women who were not excluded from the studies or found on subgroup analysis to suffer worse outcomes: women with Grade III tumors, generally women receiving chemotherapy, with large breast size such that significant heterogeneity can not be avoided, and lymph node node positive disease. SHARDA consensus guidelines now support these conclusions. However, based on her donya positivity, I have recommended a standard treatment course of 6 weeks along with treatment of the draining lymphatic regions. Furthermore, for early stage women found to have lymph nodes, there is data in support of treatment of the regional lymph nodes, [facility]. The trial randomized lymph node positive women after lumpectomy to breast radiotherapy vs breast and regional donya treatment and patients had a [facility] benefit with the more comprehensive radiation and I would therefore advocate for its use. We also discussed the results of the  trial demonstrating that women with positive sentinel lymph nodes on SLN biopsy do not need to undergo completion axillary dissection as long as they are receiving adjuvant radiation therapy. The practicalities, indications, benefits, side-effects and complications of radiation therapy were discussed. Skin changes fatigue, and potential for long-term complications including radiation pneumonitis, and rib fractures were among the complications highlighted. I have recommended a course of radiation therapy to the breast as a standard portion of breast conservation therapy. The patient and her  asked numerous questions, which were answered to their satisfaction and written informed consent for radiation therapy was obtained. Plan:  1. Genetic testing-not indicated  2. Smoking cessation-not indicated  3. Patient will be simulated, with radiotherapy to begin shortly thereafter. A dose of 50 Gy to the whole breast and regional lymph nodes followed by a 10 Gy in 5 fraction boost to the tumor bed will be prescribed.      Natacha Hopper MD   November 10, 2017

## 2017-11-10 NOTE — PROGRESS NOTES
Pt here for initial RT consult with Dr. Ana Lilia Arrieta for right breast cancer. Pt does not need chemo according to her low Mammoprint score and will have RT/AI only. She stated she is very nervous that she is not having chemo because she had a positive SN and is afraid the cancer will spread without full treatment. A brief overview of RT was given. Pt received the Skin Care sheet. Pt is aware of CT/Sim appt on 11/13/17. RT consents signed.

## 2017-11-13 ENCOUNTER — HOSPITAL ENCOUNTER (OUTPATIENT)
Dept: RADIATION ONCOLOGY | Age: 72
Discharge: HOME | End: 2017-11-13
Payer: MEDICARE

## 2017-11-13 PROCEDURE — 77332 RADIATION TREATMENT AID(S): CPT

## 2017-11-13 PROCEDURE — 77290 THER RAD SIMULAJ FIELD CPLX: CPT

## 2017-11-17 ENCOUNTER — HOSPITAL ENCOUNTER (OUTPATIENT)
Dept: RADIATION ONCOLOGY | Age: 72
Discharge: HOME | End: 2017-11-17
Payer: MEDICARE

## 2017-11-17 PROCEDURE — 77334 RADIATION TREATMENT AID(S): CPT

## 2017-11-17 PROCEDURE — 77300 RADIATION THERAPY DOSE PLAN: CPT

## 2017-11-17 PROCEDURE — 77295 3-D RADIOTHERAPY PLAN: CPT

## 2017-11-20 ENCOUNTER — HOSPITAL ENCOUNTER (OUTPATIENT)
Dept: RADIATION ONCOLOGY | Age: 72
Discharge: HOME | End: 2017-11-20
Payer: MEDICARE

## 2017-11-20 PROCEDURE — 77412 RADIATION TX DELIVERY LVL 3: CPT

## 2017-11-20 PROCEDURE — 77280 THER RAD SIMULAJ FIELD SMPL: CPT

## 2017-11-21 ENCOUNTER — HOSPITAL ENCOUNTER (OUTPATIENT)
Dept: RADIATION ONCOLOGY | Age: 72
Discharge: HOME | End: 2017-11-21
Payer: MEDICARE

## 2017-11-21 PROCEDURE — 77412 RADIATION TX DELIVERY LVL 3: CPT

## 2017-11-21 PROCEDURE — 77331 SPECIAL RADIATION DOSIMETRY: CPT

## 2017-11-21 NOTE — PROGRESS NOTES
Patient: Emmanuel Mendoza MRN: [id number]  SSN: [id number]    YOB: 1945  Age: 67 y.o. Sex: female      DIAGNOSIS:  Grade 1 IDC of the right breast, ER/ID strongly positive, HER2 equivocal by IHC and FISH (on biopsy and surgical specimen).  1.6 cm resection specimen with 1 of 1 lymph node positive for sentinel node metastases.  [id number] (stage IIA)    SITE TREATED AND DOSE DELIVERED:  Right breast and lymphatics have received 4 Gy of 50 Gy in 2/25 fractions. Boost to follow. SUBJECTIVE:  Emmanuel Mendoza is a 67 y.o. female being treated for right breast cancer. She is doing well without complaints. OBJECTIVE:  No skin reaction. There were no vitals taken for this visit. Lab Results   Component Value Date/Time    Sodium 140 10/18/2017 02:55 PM    Potassium 3.9 10/18/2017 02:55 PM    Chloride 107 10/18/2017 02:55 PM    CO2 28 10/18/2017 02:55 PM    Anion gap 5 10/18/2017 02:55 PM    Glucose 86 10/18/2017 02:55 PM    BUN 14 10/18/2017 02:55 PM    Creatinine 0.69 10/18/2017 02:55 PM    GFR est AA >60 10/18/2017 02:55 PM    GFR est non-AA >60 10/18/2017 02:55 PM    Calcium 8.9 10/18/2017 02:55 PM    Albumin 3.5 10/18/2017 02:55 PM    Protein, total 7.3 10/18/2017 02:55 PM    Globulin 3.8 10/18/2017 02:55 PM    A-G Ratio 0.9 10/18/2017 02:55 PM    AST (SGOT) 30 10/18/2017 02:55 PM    ALT (SGPT) 55 10/18/2017 02:55 PM     Lab Results   Component Value Date/Time    WBC 6.4 10/18/2017 02:55 PM    HGB 13.0 10/18/2017 02:55 PM    HCT 39.2 10/18/2017 02:55 PM    PLATELET 412 11/36/4164 02:55 PM       ASSESSMENT and PLAN:  Emmanuel Mendoza is tolerating radiation as anticipated for the current dose and fraction. We will continue on as planned with another treatment visit anticipated next week.         René Lucas MD   November 21, 2017

## 2017-11-22 ENCOUNTER — HOSPITAL ENCOUNTER (OUTPATIENT)
Dept: RADIATION ONCOLOGY | Age: 72
Discharge: HOME | End: 2017-11-22
Payer: MEDICARE

## 2017-11-22 PROCEDURE — 77412 RADIATION TX DELIVERY LVL 3: CPT

## 2017-11-27 ENCOUNTER — HOSPITAL ENCOUNTER (OUTPATIENT)
Dept: RADIATION ONCOLOGY | Age: 72
Discharge: HOME | End: 2017-11-27
Payer: MEDICARE

## 2017-11-27 PROCEDURE — 77412 RADIATION TX DELIVERY LVL 3: CPT

## 2017-11-28 ENCOUNTER — HOSPITAL ENCOUNTER (OUTPATIENT)
Dept: RADIATION ONCOLOGY | Age: 72
Discharge: HOME | End: 2017-11-28
Payer: MEDICARE

## 2017-11-28 PROCEDURE — 77412 RADIATION TX DELIVERY LVL 3: CPT

## 2017-11-28 PROCEDURE — 77336 RADIATION PHYSICS CONSULT: CPT

## 2017-11-29 ENCOUNTER — HOSPITAL ENCOUNTER (OUTPATIENT)
Dept: RADIATION ONCOLOGY | Age: 72
Discharge: HOME | End: 2017-11-29
Payer: MEDICARE

## 2017-11-29 PROCEDURE — 77412 RADIATION TX DELIVERY LVL 3: CPT

## 2017-11-29 PROCEDURE — 77417 THER RADIOLOGY PORT IMAGE(S): CPT

## 2017-11-30 ENCOUNTER — HOSPITAL ENCOUNTER (OUTPATIENT)
Dept: RADIATION ONCOLOGY | Age: 72
Discharge: HOME | End: 2017-11-30
Payer: MEDICARE

## 2017-11-30 PROCEDURE — 77412 RADIATION TX DELIVERY LVL 3: CPT

## 2017-12-01 ENCOUNTER — HOSPITAL ENCOUNTER (OUTPATIENT)
Dept: RADIATION ONCOLOGY | Age: 72
Discharge: HOME | End: 2017-12-01
Payer: MEDICARE

## 2017-12-01 PROCEDURE — 77412 RADIATION TX DELIVERY LVL 3: CPT

## 2017-12-04 ENCOUNTER — HOSPITAL ENCOUNTER (OUTPATIENT)
Dept: RADIATION ONCOLOGY | Age: 72
Discharge: HOME | End: 2017-12-04
Payer: MEDICARE

## 2017-12-04 PROCEDURE — 77412 RADIATION TX DELIVERY LVL 3: CPT

## 2017-12-05 ENCOUNTER — HOSPITAL ENCOUNTER (OUTPATIENT)
Dept: RADIATION ONCOLOGY | Age: 72
Discharge: HOME | End: 2017-12-05
Payer: MEDICARE

## 2017-12-05 PROCEDURE — 77412 RADIATION TX DELIVERY LVL 3: CPT

## 2017-12-05 NOTE — PROGRESS NOTES
Patient: Anna Avendano MRN: [id number]  SSN: [id number]    YOB: 1945  Age: 67 y.o. Sex: female      DIAGNOSIS:  Grade 1 IDC of the right breast, ER/AK strongly positive, HER2 equivocal by IHC and FISH (on biopsy and surgical specimen).  1.6 cm resection specimen with 1 of 1 lymph node positive for sentinel node metastases.  [id number] (stage IIA)    SITE TREATED AND DOSE DELIVERED:  Right breast and lymphatics have received 20 Gy of 50 Gy in 10/25 fractions. Boost to follow. SUBJECTIVE:  Anna Avendano is a 67 y.o. female being treated for right breast cancer. She is doing well without significant complaints. Moderate sore throat. OBJECTIVE:  No skin reaction. There were no vitals taken for this visit. Lab Results   Component Value Date/Time    Sodium 140 10/18/2017 02:55 PM    Potassium 3.9 10/18/2017 02:55 PM    Chloride 107 10/18/2017 02:55 PM    CO2 28 10/18/2017 02:55 PM    Anion gap 5 10/18/2017 02:55 PM    Glucose 86 10/18/2017 02:55 PM    BUN 14 10/18/2017 02:55 PM    Creatinine 0.69 10/18/2017 02:55 PM    GFR est AA >60 10/18/2017 02:55 PM    GFR est non-AA >60 10/18/2017 02:55 PM    Calcium 8.9 10/18/2017 02:55 PM    Albumin 3.5 10/18/2017 02:55 PM    Protein, total 7.3 10/18/2017 02:55 PM    Globulin 3.8 10/18/2017 02:55 PM    A-G Ratio 0.9 10/18/2017 02:55 PM    AST (SGOT) 30 10/18/2017 02:55 PM    ALT (SGPT) 55 10/18/2017 02:55 PM     Lab Results   Component Value Date/Time    WBC 6.4 10/18/2017 02:55 PM    HGB 13.0 10/18/2017 02:55 PM    HCT 39.2 10/18/2017 02:55 PM    PLATELET 935 17/55/5511 02:55 PM       ASSESSMENT and PLAN:  Anna Avendano is tolerating radiation as anticipated for the current dose and fraction. We will continue on as planned with another treatment visit anticipated next week.         Allison Ramirez MD   December 5, 2017

## 2017-12-06 ENCOUNTER — HOSPITAL ENCOUNTER (OUTPATIENT)
Dept: RADIATION ONCOLOGY | Age: 72
Discharge: HOME | End: 2017-12-06
Payer: MEDICARE

## 2017-12-06 PROCEDURE — 77336 RADIATION PHYSICS CONSULT: CPT

## 2017-12-06 PROCEDURE — 77412 RADIATION TX DELIVERY LVL 3: CPT

## 2017-12-06 PROCEDURE — 77417 THER RADIOLOGY PORT IMAGE(S): CPT

## 2017-12-07 ENCOUNTER — HOSPITAL ENCOUNTER (OUTPATIENT)
Dept: RADIATION ONCOLOGY | Age: 72
Discharge: HOME | End: 2017-12-07
Payer: MEDICARE

## 2017-12-07 PROCEDURE — 77412 RADIATION TX DELIVERY LVL 3: CPT

## 2017-12-08 ENCOUNTER — HOSPITAL ENCOUNTER (OUTPATIENT)
Dept: RADIATION ONCOLOGY | Age: 72
Discharge: HOME | End: 2017-12-08
Payer: MEDICARE

## 2017-12-08 PROCEDURE — 77412 RADIATION TX DELIVERY LVL 3: CPT

## 2017-12-11 ENCOUNTER — HOSPITAL ENCOUNTER (OUTPATIENT)
Dept: RADIATION ONCOLOGY | Age: 72
Discharge: HOME | End: 2017-12-11
Payer: MEDICARE

## 2017-12-11 PROCEDURE — 77412 RADIATION TX DELIVERY LVL 3: CPT

## 2017-12-12 ENCOUNTER — HOSPITAL ENCOUNTER (OUTPATIENT)
Dept: RADIATION ONCOLOGY | Age: 72
Discharge: HOME | End: 2017-12-12
Payer: MEDICARE

## 2017-12-12 PROCEDURE — 77412 RADIATION TX DELIVERY LVL 3: CPT

## 2017-12-12 PROCEDURE — 77336 RADIATION PHYSICS CONSULT: CPT

## 2017-12-13 ENCOUNTER — HOSPITAL ENCOUNTER (OUTPATIENT)
Dept: MAMMOGRAPHY | Age: 72
Discharge: HOME | End: 2017-12-13
Attending: INTERNAL MEDICINE
Payer: MEDICARE

## 2017-12-13 ENCOUNTER — HOSPITAL ENCOUNTER (OUTPATIENT)
Dept: RADIATION ONCOLOGY | Age: 72
Discharge: HOME | End: 2017-12-13
Payer: MEDICARE

## 2017-12-13 DIAGNOSIS — Z78.0 POST-MENOPAUSAL: ICD-10-CM

## 2017-12-13 PROCEDURE — 77412 RADIATION TX DELIVERY LVL 3: CPT

## 2017-12-13 PROCEDURE — 77080 DXA BONE DENSITY AXIAL: CPT

## 2017-12-13 PROCEDURE — 77417 THER RADIOLOGY PORT IMAGE(S): CPT

## 2017-12-14 ENCOUNTER — HOSPITAL ENCOUNTER (OUTPATIENT)
Dept: RADIATION ONCOLOGY | Age: 72
Discharge: HOME | End: 2017-12-14
Payer: MEDICARE

## 2017-12-14 PROCEDURE — 77412 RADIATION TX DELIVERY LVL 3: CPT

## 2017-12-15 ENCOUNTER — HOSPITAL ENCOUNTER (OUTPATIENT)
Dept: RADIATION ONCOLOGY | Age: 72
Discharge: HOME | End: 2017-12-15
Payer: MEDICARE

## 2017-12-15 PROCEDURE — 77412 RADIATION TX DELIVERY LVL 3: CPT

## 2017-12-18 ENCOUNTER — HOSPITAL ENCOUNTER (OUTPATIENT)
Dept: RADIATION ONCOLOGY | Age: 72
Discharge: HOME | End: 2017-12-18
Payer: MEDICARE

## 2017-12-18 PROCEDURE — 77412 RADIATION TX DELIVERY LVL 3: CPT

## 2017-12-19 ENCOUNTER — HOSPITAL ENCOUNTER (OUTPATIENT)
Dept: RADIATION ONCOLOGY | Age: 72
Discharge: HOME | End: 2017-12-19
Payer: MEDICARE

## 2017-12-19 PROCEDURE — 77336 RADIATION PHYSICS CONSULT: CPT

## 2017-12-19 PROCEDURE — 77321 SPECIAL TELETX PORT PLAN: CPT

## 2017-12-19 PROCEDURE — 77412 RADIATION TX DELIVERY LVL 3: CPT

## 2017-12-19 PROCEDURE — 77290 THER RAD SIMULAJ FIELD CPLX: CPT

## 2017-12-19 PROCEDURE — 77334 RADIATION TREATMENT AID(S): CPT

## 2017-12-19 NOTE — PROGRESS NOTES
Patient: Aditi Lam MRN: [id number]  SSN: [id number]    YOB: 1945  Age: 67 y.o. Sex: female      DIAGNOSIS:  Grade 1 IDC of the right breast, ER/NE strongly positive, HER2 equivocal by IHC and FISH (on biopsy and surgical specimen).  1.6 cm resection specimen with 1 of 1 lymph node positive for sentinel node metastases.  [id number] (stage IIA)    SITE TREATED AND DOSE DELIVERED:  Right breast and lymphatics have received 40 Gy of 50 Gy in 20/25 fractions. Boost to follow. SUBJECTIVE:  Aditi Lam is a 67 y.o. female being treated for right breast cancer. She is doing well without significant complaints. Increased soreness and beginning breakdown in the inframammary crease starting end of week 3 and worsening week 4. Energy level down a bit. OBJECTIVE:  Moderate skin reaction, small area of desquamation in inframammary crease. There were no vitals taken for this visit. Lab Results   Component Value Date/Time    Sodium 140 10/18/2017 02:55 PM    Potassium 3.9 10/18/2017 02:55 PM    Chloride 107 10/18/2017 02:55 PM    CO2 28 10/18/2017 02:55 PM    Anion gap 5 10/18/2017 02:55 PM    Glucose 86 10/18/2017 02:55 PM    BUN 14 10/18/2017 02:55 PM    Creatinine 0.69 10/18/2017 02:55 PM    GFR est AA >60 10/18/2017 02:55 PM    GFR est non-AA >60 10/18/2017 02:55 PM    Calcium 8.9 10/18/2017 02:55 PM    Albumin 3.5 10/18/2017 02:55 PM    Protein, total 7.3 10/18/2017 02:55 PM    Globulin 3.8 10/18/2017 02:55 PM    A-G Ratio 0.9 10/18/2017 02:55 PM    AST (SGOT) 30 10/18/2017 02:55 PM    ALT (SGPT) 55 10/18/2017 02:55 PM     Lab Results   Component Value Date/Time    WBC 6.4 10/18/2017 02:55 PM    HGB 13.0 10/18/2017 02:55 PM    HCT 39.2 10/18/2017 02:55 PM    PLATELET 693 96/51/9356 02:55 PM       ASSESSMENT and PLAN:  Aditi Lam is tolerating radiation as anticipated for the current dose and fraction.   We will continue on as planned with another treatment visit anticipated next week. Given Mepilex for area of desquamation. If this worsens we will treat boost and then resume whole breast treatment.        Shweta Harrison MD   December 19, 2017

## 2017-12-20 ENCOUNTER — HOSPITAL ENCOUNTER (OUTPATIENT)
Dept: RADIATION ONCOLOGY | Age: 72
Discharge: HOME | End: 2017-12-20
Payer: MEDICARE

## 2017-12-20 PROCEDURE — 77412 RADIATION TX DELIVERY LVL 3: CPT

## 2017-12-20 PROCEDURE — 77417 THER RADIOLOGY PORT IMAGE(S): CPT

## 2017-12-21 ENCOUNTER — HOSPITAL ENCOUNTER (OUTPATIENT)
Dept: RADIATION ONCOLOGY | Age: 72
Discharge: HOME | End: 2017-12-21
Payer: MEDICARE

## 2017-12-21 PROCEDURE — 77412 RADIATION TX DELIVERY LVL 3: CPT

## 2017-12-22 ENCOUNTER — HOSPITAL ENCOUNTER (OUTPATIENT)
Dept: RADIATION ONCOLOGY | Age: 72
Discharge: HOME | End: 2017-12-22
Payer: MEDICARE

## 2017-12-22 PROCEDURE — 77412 RADIATION TX DELIVERY LVL 3: CPT

## 2017-12-22 NOTE — PROGRESS NOTES
Patient: Traci Castro MRN: [id number]  SSN: [id number]    YOB: 1945  Age: 67 y.o. 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  1.6 cm resection specimen with 1 of 1 lymph node positive for sentinel node metastases.  [id number] (stage IIA)    SITE TREATED AND DOSE DELIVERED:  Right breast and lymphatics have received 46 Gy of 50 Gy in 23/25 fractions. Boost to follow. SUBJECTIVE:  Traci Castro is a 67 y.o. female being treated for right breast cancer. She is doing well without significant complaints. Increased soreness and beginning breakdown in the inframammary crease starting end of week 3 and worsening week 4. Stable week 5. Energy level down a bit. OBJECTIVE:  Moderate skin reaction, small area of desquamation in inframammary crease. There were no vitals taken for this visit. Lab Results   Component Value Date/Time    Sodium 140 10/18/2017 02:55 PM    Potassium 3.9 10/18/2017 02:55 PM    Chloride 107 10/18/2017 02:55 PM    CO2 28 10/18/2017 02:55 PM    Anion gap 5 10/18/2017 02:55 PM    Glucose 86 10/18/2017 02:55 PM    BUN 14 10/18/2017 02:55 PM    Creatinine 0.69 10/18/2017 02:55 PM    GFR est AA >60 10/18/2017 02:55 PM    GFR est non-AA >60 10/18/2017 02:55 PM    Calcium 8.9 10/18/2017 02:55 PM    Albumin 3.5 10/18/2017 02:55 PM    Protein, total 7.3 10/18/2017 02:55 PM    Globulin 3.8 10/18/2017 02:55 PM    A-G Ratio 0.9 10/18/2017 02:55 PM    AST (SGOT) 30 10/18/2017 02:55 PM    ALT (SGPT) 55 10/18/2017 02:55 PM     Lab Results   Component Value Date/Time    WBC 6.4 10/18/2017 02:55 PM    HGB 13.0 10/18/2017 02:55 PM    HCT 39.2 10/18/2017 02:55 PM    PLATELET 180 22/16/2643 02:55 PM       ASSESSMENT and PLAN:  Traci Castro is tolerating radiation as anticipated for the current dose and fraction.   We will continue on as planned with another treatment visit anticipated next week. Given Mepilex for area of desquamation. If this worsens we will treat boost and then resume whole breast treatment.  If worsens will start Silvadene cream.      Trinity Rangel MD   December 22, 2017

## 2017-12-26 ENCOUNTER — HOSPITAL ENCOUNTER (OUTPATIENT)
Dept: RADIATION ONCOLOGY | Age: 72
Discharge: HOME | End: 2017-12-26
Payer: MEDICARE

## 2017-12-26 PROCEDURE — 77412 RADIATION TX DELIVERY LVL 3: CPT

## 2017-12-27 ENCOUNTER — HOSPITAL ENCOUNTER (OUTPATIENT)
Dept: RADIATION ONCOLOGY | Age: 72
Discharge: HOME | End: 2017-12-27
Payer: MEDICARE

## 2017-12-27 PROCEDURE — 77412 RADIATION TX DELIVERY LVL 3: CPT

## 2017-12-27 PROCEDURE — 77331 SPECIAL RADIATION DOSIMETRY: CPT

## 2017-12-27 PROCEDURE — 77280 THER RAD SIMULAJ FIELD SMPL: CPT

## 2017-12-28 ENCOUNTER — HOSPITAL ENCOUNTER (OUTPATIENT)
Dept: RADIATION ONCOLOGY | Age: 72
Discharge: HOME | End: 2017-12-28
Payer: MEDICARE

## 2017-12-28 PROCEDURE — 77412 RADIATION TX DELIVERY LVL 3: CPT

## 2017-12-28 PROCEDURE — 77336 RADIATION PHYSICS CONSULT: CPT

## 2017-12-29 ENCOUNTER — HOSPITAL ENCOUNTER (OUTPATIENT)
Dept: RADIATION ONCOLOGY | Age: 72
Discharge: HOME | End: 2017-12-29
Payer: MEDICARE

## 2017-12-29 PROCEDURE — 77412 RADIATION TX DELIVERY LVL 3: CPT

## 2018-01-03 ENCOUNTER — HOSPITAL ENCOUNTER (OUTPATIENT)
Dept: RADIATION ONCOLOGY | Age: 73
Discharge: HOME | End: 2018-01-03
Payer: MEDICARE

## 2018-01-03 PROCEDURE — 77412 RADIATION TX DELIVERY LVL 3: CPT

## 2018-01-04 ENCOUNTER — HOSPITAL ENCOUNTER (OUTPATIENT)
Dept: RADIATION ONCOLOGY | Age: 73
Discharge: HOME | End: 2018-01-04
Payer: MEDICARE

## 2018-01-04 PROCEDURE — 77336 RADIATION PHYSICS CONSULT: CPT

## 2018-01-04 PROCEDURE — 77412 RADIATION TX DELIVERY LVL 3: CPT

## 2018-01-04 NOTE — DISCHARGE SUMMARY
Patient: Marium Morales MRN: [id number]  SSN: [id number]    YOB: 1945  Age: 67 y.o. Sex: female      Marium Morales is a 67 y.o. female who was seen by radiation oncology at the request of Dr. Casi Perez regarding the role of radiation therapy in further treatment of this Stage IIA right breast cancer. In 07/17 she was found to have a right breast lump with nipple inversion. She underwent diagnostic mammogram and ultrasound on 07/26/17 that demonstrated  A 2.5 cm mass at the 9 o'clock position of the right breast with nipple retraction. There were no other suspicious findings. Biopsy on 07/31/17 showed low grade invasive ductal carcinoma, ER/CA strongly positive, HER-2 equivocal by IHC and FISH. MRI of the breasts on 08/02/17 showed a 2 cm enhancing mass in the anterior right breast at the 9 o'clock position, posterior extension with a total tumor extent of 2.9 cm. No additional tumor was seen in either breast.  No significant axillary adenopathy was identified. She underwent lumpectomy and sentinel lymph node biopsy under the direction of Dr. Jessica Ordoñez on 98/26/14. Pathology showed  INFILTRATING DUCT CARCINOMA WITH LOBULAR FEATURES, LOW GRADE (WELL DIFFERENTIATED) MEASURING APPROXIMATELY 1.6 X 1.5 X 1.5 CM. EXTENSIVE LYMPHOVASCULAR INVASION. AREA OF LYMPHOVASCULAR INVASION IS PRESENT LESS THAN 0.1 CM FROM MARKED SUPERIOR MARGIN. INFILTRATING TUMOR IS PRESENT APPROXIMATELY 0.3 CM FROM MARKED INFERIOR MARGIN. OTHER MARGINS ARE GREATER THAN 1 CM FROM TUMOR. SLN biopsy demonstrated  MACROMETASTATIC CARCINOMA TO ONE OF ONE LYMPH NODES WITH ASSOCIATED EXTRACAPSULAR EXTENSION. Dr. Casi Perez recommended her to testing on the surgical specimen. This returned a negative result. Her case was discussed at breast tumor board. Based on the results of  it was felt that completion axillary dissection was not required.   Dr. Casi Perez also recommended MammaPrint testing to document whether she was at low or high risk. If low risk, endocrine therapy alone would be recommended. However, if she was found to be high risk, adjuvant chemotherapy with ddAC-T would have been recommended. MammaPrint did return a low-risk result and she was recommended not to undergo chemotherapy. The recommendation was for adjuvant radiation therapy.     Please see the details of her treatment below as well as my plans for future care and surveillance. Please do not hesitate to call with questions or concerns at any time. DIAGNOSIS:  Grade 1 IDC of the right breast, ER/WI strongly positive, HER2 equivocal by IHC and FISH (on biopsy and surgical specimen).  1.6 cm resection specimen with 1 of 1 lymph node positive for sentinel node metastases.  [id number] (stage IIA)    PREVIOUS TREATMENT:    1) 07/31/2017: Biopsy    2) 08/25/2017: lumpectomy and SLN biopsy    TREATMENT DATES:  11/20/2017 - 01/04/2018    ANATOMIC SITE:  Right breast, right Sup CLav    DOSE:  5000 cGy in 25 fractions right sup clav, 5000 cGy in 25 fractions right breast, 1000 cGy in 5 fractions right breast boost. Total 6000 cGy. BEAM ARRANGEMENT:  Conformal - 15 MV right sup clav, Conformal - 10 MV right breast, Conformal 15 E right breast boost.     CHEMOTHERAPY:  None     TREATMENT COURSE: Ms Luis Beebe did well without significant complaints. She complained of increased soreness and the beginning of skin breakdown in the inframammary crease. Skin breakdown worse with week 4. On week 5, skin changes unchanged. On week 6 she was doing better. Week 6 doing better. Energy level down a bit. PLAN:  The patient will be seen in follow up in 4 weeks to assess acute and sub acute side effects.       Yenifer Horn NP

## 2018-02-02 ENCOUNTER — HOSPITAL ENCOUNTER (OUTPATIENT)
Dept: LAB | Age: 73
Discharge: HOME | End: 2018-02-02
Payer: MEDICARE

## 2018-02-02 ENCOUNTER — PATIENT OUTREACH (OUTPATIENT)
Dept: CASE MANAGEMENT | Age: 73
End: 2018-02-02

## 2018-02-02 DIAGNOSIS — Z17.0 MALIGNANT NEOPLASM OF LOWER-OUTER QUADRANT OF RIGHT BREAST OF FEMALE, ESTROGEN RECEPTOR POSITIVE (HCC): ICD-10-CM

## 2018-02-02 DIAGNOSIS — C50.511 MALIGNANT NEOPLASM OF LOWER-OUTER QUADRANT OF RIGHT BREAST OF FEMALE, ESTROGEN RECEPTOR POSITIVE (HCC): ICD-10-CM

## 2018-02-02 LAB
ALBUMIN SERPL-MCNC: 3.4 G/DL (ref 3.2–4.6)
ALBUMIN/GLOB SERPL: 0.9 {RATIO} (ref 1.2–3.5)
ALP SERPL-CCNC: 100 U/L (ref 50–136)
ALT SERPL-CCNC: 26 U/L (ref 12–65)
ANION GAP SERPL CALC-SCNC: 7 MMOL/L (ref 7–16)
AST SERPL-CCNC: 16 U/L (ref 15–37)
BASOPHILS # BLD: 0 K/UL (ref 0–0.2)
BASOPHILS NFR BLD: 1 % (ref 0–2)
BILIRUB SERPL-MCNC: 0.3 MG/DL (ref 0.2–1.1)
BUN SERPL-MCNC: 12 MG/DL (ref 8–23)
CALCIUM SERPL-MCNC: 9.2 MG/DL (ref 8.3–10.4)
CHLORIDE SERPL-SCNC: 108 MMOL/L (ref 98–107)
CO2 SERPL-SCNC: 28 MMOL/L (ref 21–32)
CREAT SERPL-MCNC: 0.7 MG/DL (ref 0.6–1)
DIFFERENTIAL METHOD BLD: ABNORMAL
EOSINOPHIL # BLD: 0.2 K/UL (ref 0–0.8)
EOSINOPHIL NFR BLD: 3 % (ref 0.5–7.8)
ERYTHROCYTE [DISTWIDTH] IN BLOOD BY AUTOMATED COUNT: 13.5 % (ref 11.9–14.6)
GLOBULIN SER CALC-MCNC: 3.7 G/DL (ref 2.3–3.5)
GLUCOSE SERPL-MCNC: 88 MG/DL (ref 65–100)
HCT VFR BLD AUTO: 40.7 % (ref 35.8–46.3)
HGB BLD-MCNC: 13.6 G/DL (ref 11.7–15.4)
LYMPHOCYTES # BLD: 0.9 K/UL (ref 0.5–4.6)
LYMPHOCYTES NFR BLD: 17 % (ref 13–44)
MCH RBC QN AUTO: 31.6 PG (ref 26.1–32.9)
MCHC RBC AUTO-ENTMCNC: 33.4 G/DL (ref 31.4–35)
MCV RBC AUTO: 94.4 FL (ref 79.6–97.8)
MONOCYTES # BLD: 0.4 K/UL (ref 0.1–1.3)
MONOCYTES NFR BLD: 8 % (ref 4–12)
NEUTS SEG # BLD: 3.8 K/UL (ref 1.7–8.2)
NEUTS SEG NFR BLD: 71 % (ref 43–78)
NRBC # BLD: 0 K/UL (ref 0–0.2)
PLATELET # BLD AUTO: 195 K/UL (ref 150–450)
PMV BLD AUTO: 9.6 FL (ref 10.8–14.1)
POTASSIUM SERPL-SCNC: 4.4 MMOL/L (ref 3.5–5.1)
PROT SERPL-MCNC: 7.1 G/DL (ref 6.3–8.2)
RBC # BLD AUTO: 4.31 M/UL (ref 4.05–5.25)
SODIUM SERPL-SCNC: 143 MMOL/L (ref 136–145)
WBC # BLD AUTO: 5.4 K/UL (ref 4.3–11.1)

## 2018-02-02 PROCEDURE — 36415 COLL VENOUS BLD VENIPUNCTURE: CPT | Performed by: INTERNAL MEDICINE

## 2018-02-02 PROCEDURE — 85025 COMPLETE CBC W/AUTO DIFF WBC: CPT | Performed by: INTERNAL MEDICINE

## 2018-02-02 PROCEDURE — 80053 COMPREHEN METABOLIC PANEL: CPT | Performed by: INTERNAL MEDICINE

## 2018-02-02 NOTE — PROGRESS NOTES
2/2/18 saw pt today with Dr. Sherryle Oaks for follow up breast cancer. She completed radiation on 1/4. Tolerated well and is back to normal.  Tolerating arimidex well, she is reporting some mild joint aches. PO intake is good. Scheduled to follow up with radiation oncology. Provided opportunity to ask questions and all were discussed. Survivorship care plan was reviewed with pt and a copy was provided. Follow up in 6 months. Navigation will sign off.

## 2018-02-06 ENCOUNTER — HOSPITAL ENCOUNTER (OUTPATIENT)
Dept: RADIATION ONCOLOGY | Age: 73
Discharge: HOME | End: 2018-02-06
Payer: MEDICARE

## 2018-02-06 VITALS
BODY MASS INDEX: 43.41 KG/M2 | TEMPERATURE: 97.6 F | SYSTOLIC BLOOD PRESSURE: 163 MMHG | DIASTOLIC BLOOD PRESSURE: 86 MMHG | HEART RATE: 70 BPM | WEIGHT: 252.9 LBS | OXYGEN SATURATION: 96 %

## 2018-02-06 DIAGNOSIS — C50.811 CANCER OF OVERLAPPING SITES OF RIGHT BREAST (HCC): Primary | ICD-10-CM

## 2018-02-06 PROCEDURE — 99211 OFF/OP EST MAY X REQ PHY/QHP: CPT

## 2018-02-06 NOTE — PROGRESS NOTES
One month f/u right breast cancer. S/p lumpectomy 8-25-17. RT end 1-4-18. Bone debsity  12-13-17-Osteopenia. Taking calcium/Vitamin D. F/u Dr. Catalino Perez 8-3-18.     Marilynn Parker RN

## 2018-02-06 NOTE — PROGRESS NOTES
Patient: Maral Chatterjee MRN: [id number]  SSN: [id number]    YOB: 1945  Age: 67 y.o. Sex: female      Other Providers:    MD Maritza Stephens MD    CHIEF COMPLAINT: Breast cancer    DIAGNOSIS: Grade 1 IDC of the right breast, ER/VA strongly positive, HER2 equivocal by IHC and FISH (on biopsy and surgical specimen). 1.6 cm resection specimen with 1 of 1 lymph node positive for sentinel node metastases. [id number] (stage IIA)    ANATOMIC SITE:  Right breast, right Sup CLav     DOSE:  5000 cGy in 25 fractions right sup clav, 5000 cGy in 25 fractions right breast, 1000 cGy in 5 fractions right breast boost.     TREATMENT DATES:  11/20/2017 - 01/04/2018    HISTORY OF PRESENT ILLNESS:  Maral Chatterjee is a 67 y.o. female initially seen by Dr. Wilhelmena Duverney at the request of Dr. Chester Bill regarding the role of radiation therapy in further treatment of this Stage IIA right breast cancer. In 07/17 she was found to have a right breast lump with nipple inversion. She underwent diagnostic mammogram and ultrasound on 07/26/17 that demonstrated  A 2.5 cm mass at the 9 o'clock position of the right breast with nipple retraction. There were no other suspicious findings. Biopsy on 07/31/17 showed low grade invasive ductal carcinoma, ER/VA strongly positive, HER-2 equivocal by IHC and FISH. MRI of the breasts on 08/02/17 showed a 2 cm enhancing mass in the anterior right breast at the 9 o'clock position, posterior extension with a total tumor extent of 2.9 cm. No additional tumor was seen in either breast.  No significant axillary adenopathy was identified. She underwent lumpectomy and sentinel lymph node biopsy under the direction of Dr. Sol Hargrove on 71/01/15. Pathology showed  INFILTRATING DUCT CARCINOMA WITH LOBULAR FEATURES, LOW GRADE (WELL DIFFERENTIATED) MEASURING APPROXIMATELY 1.6 X 1.5 X 1.5 CM. EXTENSIVE LYMPHOVASCULAR INVASION.  AREA OF LYMPHOVASCULAR INVASION IS PRESENT LESS THAN 0.1 CM FROM MARKED SUPERIOR MARGIN. INFILTRATING TUMOR IS PRESENT APPROXIMATELY 0.3 CM FROM MARKED INFERIOR MARGIN. OTHER MARGINS ARE GREATER THAN 1 CM FROM TUMOR. SLN biopsy demonstrated  MACROMETASTATIC CARCINOMA TO ONE OF ONE LYMPH NODES WITH ASSOCIATED EXTRACAPSULAR EXTENSION. Dr. Kaelyn Marrero recommended her to testing on the surgical specimen. This returned a negative result. Her case was discussed at breast tumor board. Based on the results of  it was felt that completion axillary dissection was not required. Dr. Kaelyn Marrero also recommended MammaPrint testing to document whether she was at low or high risk. If low risk, endocrine therapy alone would be recommended. However, if she was found to be high risk, adjuvant chemotherapy with ddAC-T would be recommended. MammaPrint did return a low-risk result and she was recommended not to undergo chemotherapy. Regardless of these results, radiation therapy was recommended. INTERVAL HISTORY: Ms Darnella Meckel returns today 1 month after completing radiation therapy of the right breast.   At this time, Ms. Darnella Meckel is doing very well. She has some tanning of the skin. She has no residual pain. She has no systemic complaints. She was started on Arimidex and is tolerating fairly well. She has little to no hot flashes that are not bothersome. Her energy is good. PAST MEDICAL HISTORY:    Past Medical History:   Diagnosis Date    Anxiety     BMI 40.0-44.9, adult [facility] [address]     Cancer ([facility] [address] 08/2017    right breast     Depression     Diverticulosis     GERD (gastroesophageal reflux disease)     zantac BID    Nausea & vomiting     post-op N/V    Osteoarthritis     Sleep apnea     c-pap       The patient denies history of collagen vascular diseases, pacemaker insertion, prior radiation or prior chemotherapy.      PAST SURGICAL HISTORY:   Past Surgical History:   Procedure Laterality Date    HX BREAST BIOPSY Left 1990's    HX BREAST BIOPSY Right 07/31/2017    US Bx    HX BREAST BIOPSY Right 8/25/2017    RIGHT BREAST NEEDLE LOCALIZED BIOPSY performed by Alireza An MD at [facility] HX BREAST LUMPECTOMY Right 8/25/2017    RIGHT BRE[facility] HX KNEE REPLACEMENT Bilateral 2015    HX PARTIAL HYSTERECTOMY      vaginal    HX TUBAL LIGATION  1979       MEDICATIONS:     Current Outpatient Prescriptions:     triamcinolone (ARISTOCORT) 0.5 % topical cream, Apply  to affected area two (2) times a day. use thin layer, Disp: 15 g, Rfl: 0    anastrozole (ARIMIDEX) 1 mg tablet, Take 1 Tab by mouth daily. , Disp: 30 Tab, Rfl: 5    docusate sodium (STOOL SOFTENER) 100 mg tab, Take 1 Tab by mouth daily as needed. Indications: constipation, Disp: , Rfl:     cranberry extract 450 mg tab tablet, Take 450 mg by mouth daily. , Disp: , Rfl:     FLUoxetine (PROZAC) 40 mg capsule, Take 1 Cap by mouth daily. , Disp: 30 Cap, Rfl: 11    cpap machine kit, by Does Not Apply route. 15 cm, Disp: , Rfl:     ibuprofen (MOTRIN) 200 mg tablet, Take 800 mg by mouth every eight (8) hours as needed for Pain., Disp: , Rfl:     aspirin (ASPIRIN) 325 mg tablet, Take 975 mg by mouth as needed for Pain., Disp: , Rfl:     ranitidine (ZANTAC) 150 mg tablet, Take 150 mg by mouth two (2) times a day. Take / use AM day of surgery  per anesthesia protocols.   Indications: gastroesophageal reflux disease, Disp: , Rfl:     ALLERGIES:   Allergies   Allergen Reactions    Latex Rash     Blisters         SOCIAL HISTORY:   Social History     Social History    Marital status:      Spouse name: N/A    Number of children: N/A    Years of education: N/A     Occupational History    retired      Social History Main Topics    Smoking status: Never Smoker    Smokeless tobacco: Never Used    Alcohol use No    Drug use: No    Sexual activity: Not on file     Other Topics Concern    Caffeine Concern Not Asked     3-5 cups per day    Exercise Yes     1-3 days per week     Social History Narrative       FAMILY HISTORY:   Family History   Problem Relation Age of Onset    Depression Mother     Heart Attack Father      before age 59    Alcohol abuse Brother     Asthma Brother     Diabetes Brother     No Known Problems Brother     Breast Cancer Neg Hx        REVIEW OF SYSTEMS: Please see the completed review of systems sheet in the chart that I have reviewed today. PHYSICAL EXAMINATION:   ECOG Performance status 0-1  VITAL SIGNS:   Visit Vitals    /86 (BP 1 Location: Left arm, BP Patient Position: Sitting)    Pulse 70    Temp 97.6 °F (36.4 °C)    Wt 252 lb 14.4 oz (114.7 kg)    SpO2 96%    BMI 43.41 kg/m2        GENERAL: The patient is well-developed, ambulatory, alert and in no acute distress. BREASTS: Examination of the unaffected breast reveals no dominant nodules or masses. The lumpectomy cavity appears well healed with good aesthetics and symmetry. PATHOLOGY:      08/25/17:    DIAGNOSIS   A: RIGHT BREAST LUMPECTOMY: INFILTRATING DUCT CARCINOMA WITH LOBULAR FEATURES, LOW GRADE (WELL DIFFERENTIATED) MEASURING APPROXIMATELY 1.6 X 1.5 X 1.5 CM. EXTENSIVE LYMPHOVASCULAR INVASION. AREA OF LYMPHOVASCULAR INVASION IS PRESENT LESS THAN 0.1 CM FROM MARKED SUPERIOR MARGIN. INFILTRATING TUMOR IS PRESENT APPROXIMATELY 0.3 CM FROM MARKED INFERIOR MARGIN. OTHER MARGINS ARE GREATER THAN 1 CM FROM TUMOR. DEFINITE IN SITU COMPONENT IS NOT IDENTIFIED. SEE COMMENT. B: SENTINEL NODE: MACROMETASTATIC CARCINOMA TO ONE OF ONE LYMPH NODES WITH ASSOCIATED EXTRACAPSULAR EXTENSION. Comment   Infiltrating carcinoma in this specimen is similar to that in prior core biopsy [id number] which has estrogen receptors of 100%, progesterone receptors of 97%, Her-2 2+ equivocal by IHC and equivocal by FISH. Because of Her-2 studies, FISH for Her-2 will be repeated on block A5. Estrogen receptors and progesterone receptors can be repeated if clinically indicated.      07/31/17: DIAGNOSIS   RIGHT BREAST AT 9 O'CLOCK, 2 CM FROM NIPPLE, ULTRASOUND GUIDED CORE NEEDLE BIOPSY:   INVASIVE DUCTAL CARCINOMA. ANATOMIC SITE: Right breast at 9 o'clock, 2 cm from nipple. PROCEDURE: Core needle biopsy. HISTOLOGIC TYPE: Ductal carcinoma, NOS. SIZE: At least 1.3 cm in greatest dimension. CHITRA MODIFICATION OF BLOOM-CESPEDES GRADE:   ARCHITECTURAL SCORE: 3/3. NUCLEAR SCORE: 2/3. MITOTIC SCORE: 1/3. TOTAL SCORE: 6/9 = Grade 2/3. IN-SITU COMPONENT: Not identified. LYMPHOVASCULAR INVASION: Not identified. PERINEURAL INVASION: Present. MICROCALCIFICATIONS: Present. COLD ISCHEMIC TIME: Less than 1 minute. TOTAL TIME IN FORMALIN: 11 hours 52 minutes. BLOCK A1 SENT FOR BREAST CARCINOMA PROGNOSTIC INDICATOR ANALYSIS, SEPARATE ADDENDUM REPORT TO FOLLOW. Procedures/Addenda   [facility]- [id number] BY IHC   Status: Signed Out Jeremiah Robert MD on 2017   Interpretation   [employer] IHC Quantitative Breast Panel   TEST NAME: RESULTS: INTERNAL CONTROLS:   ESTROGEN RECEPTOR: Positive (99.8%) Present, Positive   PROGESTERONE RECEPTOR: Positive (96.6%) Present, Positive   HER-2/VLADISLAV: Equivocal (2+) Percentage of Cells with Uniform   Intense Complete Membrane   Stainin%   FISH Her-2/vladisalv has been ordered and will be reported in an addendum. See full report in [facility]. [facility] - [id number] BY FISH   Status: Signed Out Faraz Mcnelil MD on 8/15/2017   Interpretation   [facility] FISH Analysis/HER2 Breast:   RESULTS: Equivocal   [facility]. [facility] - [id number] BY FISH   Status: Signed Out Juwan Witt M.D., Ph.D. on 10/5/2017   Interpretation   [facility] HER2 Breast Equivocal FISH Panel Result:   Negative. See full report in [facility].      LABORATORY:   Lab Results   Component Value Date/Time    Sodium 143 2018 12:28 PM    Potassium 4.4 2018 12:28 PM    Chloride 108 2018 12:28 PM    CO2 28 2018 12:28 PM    Anion gap 7 02/02/2018 12:28 PM    Glucose 88 02/02/2018 12:28 PM    BUN 12 02/02/2018 12:28 PM    Creatinine 0.70 02/02/2018 12:28 PM    GFR est AA >60 02/02/2018 12:28 PM    GFR est non-AA >60 02/02/2018 12:28 PM    Calcium 9.2 02/02/2018 12:28 PM    Albumin 3.4 02/02/2018 12:28 PM    Protein, total 7.1 02/02/2018 12:28 PM    Globulin 3.7 02/02/2018 12:28 PM    A-G Ratio 0.9 02/02/2018 12:28 PM    AST (SGOT) 16 02/02/2018 12:28 PM    ALT (SGPT) 26 02/02/2018 12:28 PM     Lab Results   Component Value Date/Time    WBC 5.4 02/02/2018 12:28 PM    HGB 13.6 02/02/2018 12:28 PM    HCT 40.7 02/02/2018 12:28 PM    PLATELET 804 64/34/9800 12:28 PM       RADIOLOGY:      08/02/17: MRI of the Breasts     INDICATION:  New diagnosis of breast cancer     Standard MRI sequences were obtained through the breasts in multiple planes. Images were obtained before and after intravenous infusion of 20 ml of  Multihance.      FINDINGS:  There is an irregular enhancing 2.2 cm mass at 9:00 in the right breast  consistent with known breast tumor. The mass measures 2.2 cm in diameter. There is a small focus of enhancement immediately posterior to the mass in the  lateral right breast, likely local extension of tumor. Total extent is 2.9 cm. No additional tumor is seen in the right breast.  There is no significant right  axillary adenopathy.     There is mild background enhancement of the left breast.  There is no  significant left axillary adenopathy.   There are no chest wall lesions.     IMPRESSION:   1.  2 cm enhancing mass in the anterior right breast at 9:00, posterior  extension with a total tumor extent of 2.9 cm.  2.  No additional tumor in either breast.     BI-RADS Assessment Category 6: Known Biopsy Proven Malignancy      07/26/17: DIAGNOSTIC DIGITAL BILATERAL MAMMOGRAPHY AND BILATERAL BREAST ULTRASOUND      CLINICAL INDICATION: Patient reports approximately one week of right palpable  lump with skin thickening and nipple inversion, remote left benign excision     COMPARISON: 3/23/2009 from [facility]:      Mammogram: Digital diagnostic mammography was performed, and is interpreted in  conjunction with a computer assisted detection (CAD) system.       Standard views bilaterally demonstrate scattered glandular densities. Additional  bilateral mediolateral and compression magnification views were performed. On  the right there is an irregular mass in the anterior slightly lateral breast  corresponding to the area of symptoms. On the left there is a small oval  circumscribed mass anteriorly at 3:00 new since prior. Ultrasound is recommended  to further evaluate these areas.     Otherwise there are scattered bilateral typically benign calcifications with no  suspicious cluster. There is no skin thickening or distortion on the left. Circumscribed 1.7 cm fibroadenoma in the right anterior slightly medial breast  is unchanged.     Ultrasound: Real time targeted sonography was performed of the bilateral breast  by the radiologist and sonographer. Corresponding to the mammographic finding on  the left at 3:00 3 cm from the nipple is a 0.9 cm benign cyst. Corresponding to  the area of symptoms on the right at 9:00 2 cm from the nipple is an irregular  heterogeneously hypoechoic 2.5 x 1.7 x 1.4 cm mass with posterior shadowing and  no definite Doppler flow. The right axilla demonstrates no obvious  lymphadenopathy.     IMPRESSION:   1. Right 9:00 palpable 2.5 cm mass, with nipple retraction. Recommend  ultrasound-guided biopsy. 2. No other suspicious finding on either side. Benign left 3:00 small cyst  noted. IMPRESSION:  Hortencia Allen is a 67 y.o. female with Grade 1 IDC of the right breast, ER/FL strongly positive, HER2 equivocal by IHC and FISH (on biopsy and surgical specimen). 1.6 cm resection specimen with 1 of 1 lymph node positive for sentinel node metastases. [id number] (stage IIA).  She completed radiation therapy of the right breast, 01/04/2018. She was started on Arimidex after completing radiation therapy under the direction of Dr. Chana Bentley. Ms Kisha Sánchez is now 1 month after completing radiation therapy of the right breast. She is recovering as anticipated from treatment. She is tolerating Arimidex at this time. Plan:  1) Bilateral mammogram in July. I will see her shortly after. 2) Continue calcium and vitamin D supplementation. Repeat DEXA, 12/2019  3) Endocrine therapy (arimidex) as directed by Dr. Chana Bentley  4) I spent 30 minutes in the care of Ms Kisha Sánchez today, over 50% of which was in direct counseling and ongoing management of her IDC breast cancer. All questions were answered to the best of my ability. Eda Man NP   February 6, 2018      Portions of this note were copied from prior encounters and reviewed for accuracy, currency, and represent documentation and tasks completed during this encounter. I verify and attest these portions to be unchanged from prior visits.

## 2018-04-05 PROBLEM — F32.1 MODERATE MAJOR DEPRESSION (HCC): Status: ACTIVE | Noted: 2018-04-05

## 2018-07-27 ENCOUNTER — HOSPITAL ENCOUNTER (OUTPATIENT)
Dept: MAMMOGRAPHY | Age: 73
Discharge: HOME | End: 2018-07-27
Attending: NURSE PRACTITIONER
Payer: MEDICARE

## 2018-07-27 DIAGNOSIS — C50.811 CANCER OF OVERLAPPING SITES OF RIGHT BREAST (HCC): ICD-10-CM

## 2018-07-27 PROCEDURE — 77066 DX MAMMO INCL CAD BI: CPT

## 2018-07-31 ENCOUNTER — HOSPITAL ENCOUNTER (OUTPATIENT)
Dept: RADIATION ONCOLOGY | Age: 73
Discharge: HOME | End: 2018-07-31
Payer: MEDICARE

## 2018-07-31 VITALS
WEIGHT: 238.3 LBS | HEART RATE: 84 BPM | HEIGHT: 63 IN | RESPIRATION RATE: 16 BRPM | OXYGEN SATURATION: 92 % | TEMPERATURE: 98.1 F | DIASTOLIC BLOOD PRESSURE: 78 MMHG | BODY MASS INDEX: 42.22 KG/M2 | SYSTOLIC BLOOD PRESSURE: 145 MMHG

## 2018-07-31 DIAGNOSIS — C50.919 MALIGNANT NEOPLASM OF FEMALE BREAST, UNSPECIFIED ESTROGEN RECEPTOR STATUS, UNSPECIFIED LATERALITY, UNSPECIFIED SITE OF BREAST (HCC): Primary | ICD-10-CM

## 2018-07-31 DIAGNOSIS — C50.911 MALIGNANT NEOPLASM OF RIGHT FEMALE BREAST, UNSPECIFIED ESTROGEN RECEPTOR STATUS, UNSPECIFIED SITE OF BREAST (HCC): ICD-10-CM

## 2018-07-31 PROCEDURE — 99211 OFF/OP EST MAY X REQ PHY/QHP: CPT

## 2018-07-31 NOTE — PROGRESS NOTES
Patient: Felipe Ybarra MRN: [id number]  SSN: [id number] YOB: 1945  Age: 75 y.o. Sex: female Other Providers:    MD Bethel Flores MD 
 
CHIEF COMPLAINT: Breast cancer DIAGNOSIS: Grade 1 IDC of the right breast, ER/SC strongly positive, HER2 equivocal by IHC and FISH (on biopsy and surgical specimen). 1.6 cm resection specimen with 1 of 1 lymph node positive for sentinel node metastases. [id number] (stage IIA) PREVIOUS TREATMENT:   
1) 07/31/2017: Biopsy 2) 08/25/2017: lumpectomy and SLN biopsy 3) Radiation of the right breast, right Sup CLav. DOSE:  5000 cGy in 25 fractions right sup clav, 5000 cGy in 25 fractions right breast, 1000 cGy in 5 fractions right breast boost. TREATMENT DATES:  11/20/2017 - 01/04/2018 4) Arimidex, 01/2018. HISTORY OF PRESENT ILLNESS:  Felipe Ybarra is a 75 y.o. female initially seen by Dr. Radha Louise at the request of Dr. Conrado Zavala regarding the role of radiation therapy in further treatment of this Stage IIA right breast cancer. In 07/17 she was found to have a right breast lump with nipple inversion. She underwent diagnostic mammogram and ultrasound on 07/26/17 that demonstrated  A 2.5 cm mass at the 9 o'clock position of the right breast with nipple retraction. There were no other suspicious findings. Biopsy on 07/31/17 showed low grade invasive ductal carcinoma, ER/SC strongly positive, HER-2 equivocal by IHC and FISH. MRI of the breasts on 08/02/17 showed a 2 cm enhancing mass in the anterior right breast at the 9 o'clock position, posterior extension with a total tumor extent of 2.9 cm. No additional tumor was seen in either breast.  No significant axillary adenopathy was identified. She underwent lumpectomy and sentinel lymph node biopsy under the direction of Dr. Taylor Castle on 86/88/50.   Pathology showed  INFILTRATING DUCT CARCINOMA WITH LOBULAR FEATURES, LOW GRADE (WELL DIFFERENTIATED) MEASURING APPROXIMATELY 1.6 X 1.5 X 1.5 CM. EXTENSIVE LYMPHOVASCULAR INVASION. AREA OF LYMPHOVASCULAR INVASION IS PRESENT LESS THAN 0.1 CM FROM MARKED SUPERIOR MARGIN. INFILTRATING TUMOR IS PRESENT APPROXIMATELY 0.3 CM FROM MARKED INFERIOR MARGIN. OTHER MARGINS ARE GREATER THAN 1 CM FROM TUMOR. SLN biopsy demonstrated MACROMETASTATIC CARCINOMA TO ONE OF ONE LYMPH NODES WITH ASSOCIATED EXTRACAPSULAR EXTENSION. Dr. Anuj Byers recommended her to testing on the surgical specimen. This returned a negative result. Her case was discussed at breast tumor board. Based on the results of  it was felt that completion axillary dissection was not required. Dr. Anuj Byers also recommended MammaPrint testing to document whether she was at low or high risk. If low risk, endocrine therapy alone would be recommended. However, if she was found to be high risk, adjuvant chemotherapy with ddAC-T would be recommended. MammaPrint did return a low-risk result and she was recommended not to undergo chemotherapy. Regardless of these results, radiation therapy was recommended. INTERVAL HISTORY: Ms Savi Paul returns today 6 months after completing radiation therapy of the right breast.  
At this time, Ms. Savi Paul is doing very well. She has no residual pain. Recent mammogram is negative for malignancy. Tolerating Arimidex at this time. She reports having a rash across the abdomen that itchy. She was seen by her primary care physician and treated with steroids. The rash has resolved, but now reports having swelling of the corners of her mouth, 1-2 times a week. She states the swelling resolves after a short period of time and is non itchy or painful. She has minimal hot flashes that are not bothersome. Her energy is good. PAST MEDICAL HISTORY:   
Past Medical History:  
Diagnosis Date  Anxiety  BMI 40.0-44.9, adult ([facility] [address]  Cancer ([facility] [address] 08/2017  
 right breast   
 Depression  Diverticulosis  GERD (gastroesophageal reflux disease)   
 zantac BID  Nausea & vomiting   
 post-op N/V  
 Osteoarthritis  Sleep apnea   
 c-pap The patient denies history of collagen vascular diseases, pacemaker insertion, prior radiation or prior chemotherapy. PAST SURGICAL HISTORY:  
Past Surgical History:  
Procedure Laterality Date  HX BREAST BIOPSY Left 1990's  HX BREAST BIOPSY Right 07/31/2017 US Bx  
 HX BREAST BIOPSY Right 8/25/2017 RIGHT BREAST NEEDLE LOCALIZED BIOPSY performed by Evan Raymond MD at [address] [place] [address] HX BREAST LUMPECTOMY Right 8/25/2017 RIGHT BREAST LUMPECTOMY performed by Evan Raymond MD at [address] [place] [address] HX KNEE REPLACEMENT Bilateral 2015  HX PARTIAL HYSTERECTOMY    
 vaginal  
 HX TUBAL LIGATION  1979 MEDICATIONS:  
 
Current Outpatient Prescriptions:  
  FLUoxetine (PROZAC) 40 mg capsule, TAKE ONE CAPSULE BY MOUTH ONCE DAILY, Disp: 30 Cap, Rfl: 11 
  anastrozole (ARIMIDEX) 1 mg tablet, Take 1 Tab by mouth daily. , Disp: 30 Tab, Rfl: 5 
  predniSONE (STERAPRED DS) 10 mg dose pack, See administration instruction per 10mg dose pack, Disp: 21 Tab, Rfl: 0 
  triamcinolone (ARISTOCORT) 0.5 % topical cream, Apply  to affected area two (2) times a day. use thin layer, Disp: 30 g, Rfl: 1 
  losartan (COZAAR) 100 mg tablet, Take 1 Tab by mouth daily. , Disp: 30 Tab, Rfl: 11 
  cetirizine (ZYRTEC) 10 mg tablet, Take 1 Tab by mouth nightly., Disp: 30 Tab, Rfl: prn 
  naltrexone-buPROPion (CONTRAVE) 8-90 mg TbER ER tablet, First Month: Contrave 8mg/90mg #70 Week 1 : 1 Tab AM Week 2 : 1 Tab AM, 1 Tab PM Week 3 : 2 Tab AM, 1 Tab PM Week 4 : 2 Tab AM, 2 Tab PM Week 5 and Beyond: Contrave 8mg/90mg #120  2 Tab AM, 2 Tab PM    Indications: WEIGHT LOSS MANAGEMENT FOR OBESE PATIENT (BMI >= 30), Disp: 70 Tab, Rfl: 5 
  crisaborole (EUCRISA) 2 % oint, Bid thin layer, Disp: 2 Tube, Rfl: 0 
  buPROPion XL (WELLBUTRIN XL) 150 mg tablet, Take 1 Tab by mouth every morning., Disp: 30 Tab, Rfl: 11 
  CALCIUM PO, Take 600 mg by mouth two (2) times a day., Disp: , Rfl:  
  CHOLECALCIFEROL, VITAMIN D3, (VITAMIN D3 PO), Take 1,000 Units by mouth daily. , Disp: , Rfl:  
  docusate sodium (STOOL SOFTENER) 100 mg tab, Take 1 Tab by mouth daily as needed. Indications: constipation, Disp: , Rfl:  
  cranberry extract 450 mg tab tablet, Take 450 mg by mouth daily. , Disp: , Rfl:  
  cpap machine kit, by Does Not Apply route. 15 cm, Disp: , Rfl:  
  ibuprofen (MOTRIN) 200 mg tablet, Take 800 mg by mouth every eight (8) hours as needed for Pain., Disp: , Rfl:  
  aspirin (ASPIRIN) 325 mg tablet, Take 975 mg by mouth as needed for Pain., Disp: , Rfl:  
  ranitidine (ZANTAC) 150 mg tablet, Take 150 mg by mouth two (2) times a day. Take / use AM day of surgery  per anesthesia protocols. Indications: gastroesophageal reflux disease, Disp: , Rfl: ALLERGIES:  
Allergies Allergen Reactions  Latex Rash Blisters SOCIAL HISTORY:  
Social History Social History  Marital status:  Spouse name: N/A  
 Number of children: N/A  
 Years of education: N/A Occupational History  retired Social History Main Topics  Smoking status: Never Smoker  Smokeless tobacco: Never Used  Alcohol use No  
 Drug use: No  
 Sexual activity: Not on file Other Topics Concern  Caffeine Concern Not Asked 3-5 cups per day  Exercise Yes  
  1-3 days per week Social History Narrative FAMILY HISTORY:  
Family History Problem Relation Age of Onset  Depression Mother  Heart Attack Father   
  before age 61  Alcohol abuse Brother  Asthma Brother  Diabetes Brother  No Known Problems Brother  Breast Cancer Neg Hx REVIEW OF SYSTEMS: Please see the completed review of systems sheet in the chart that I have reviewed today. PHYSICAL EXAMINATION:  
ECOG Performance status 0 
VITAL SIGNS: Blood pressure 145/78   Pulse 84  Temperature 98.1  Weight  238.3 GENERAL: The patient is well-developed, ambulatory, alert and in no acute distress. BREASTS: Examination of the unaffected breast reveals no dominant nodules or masses. The lumpectomy cavity appears well healed with good aesthetics and symmetry. PATHOLOGY:   
 
08/25/17:  
 DIAGNOSIS  
A: RIGHT BREAST LUMPECTOMY: INFILTRATING DUCT CARCINOMA WITH LOBULAR FEATURES, LOW GRADE (WELL DIFFERENTIATED) MEASURING APPROXIMATELY 1.6 X 1.5 X 1.5 CM. EXTENSIVE LYMPHOVASCULAR INVASION. AREA OF LYMPHOVASCULAR INVASION IS PRESENT LESS THAN 0.1 CM FROM MARKED SUPERIOR MARGIN. INFILTRATING TUMOR IS PRESENT APPROXIMATELY 0.3 CM FROM MARKED INFERIOR MARGIN. OTHER MARGINS ARE GREATER THAN 1 CM FROM TUMOR. DEFINITE IN SITU COMPONENT IS NOT IDENTIFIED. SEE COMMENT. B: SENTINEL NODE: MACROMETASTATIC CARCINOMA TO ONE OF ONE LYMPH NODES WITH ASSOCIATED EXTRACAPSULAR EXTENSION. Comment Infiltrating carcinoma in this specimen is similar to that in prior core biopsy [id number] which has estrogen receptors of 100%, progesterone receptors of 97%, Her-2 2+ equivocal by IHC and equivocal by FISH. Because of Her-2 studies, FISH for Her-2 will be repeated on block A5. Estrogen receptors and progesterone receptors can be repeated if clinically indicated. 07/31/17:  
 DIAGNOSIS RIGHT BREAST AT 9 O'CLOCK, 2 CM FROM NIPPLE, ULTRASOUND GUIDED CORE NEEDLE BIOPSY:  
INVASIVE DUCTAL CARCINOMA. ANATOMIC SITE: Right breast at 9 o'clock, 2 cm from nipple. PROCEDURE: Core needle biopsy. HISTOLOGIC TYPE: Ductal carcinoma, NOS. SIZE: At least 1.3 cm in greatest dimension. CHITRA MODIFICATION OF BLOOM-CESPEDES GRADE:  
ARCHITECTURAL SCORE: 3/3. NUCLEAR SCORE: 2/3. MITOTIC SCORE: 1/3. TOTAL SCORE: 6/9 = Grade 2/3. IN-SITU COMPONENT: Not identified. LYMPHOVASCULAR INVASION: Not identified. PERINEURAL INVASION: Present. MICROCALCIFICATIONS: Present. COLD ISCHEMIC TIME: Less than 1 minute. TOTAL TIME IN FORMALIN: 11 hours 52 minutes.   
BLOCK A1 SENT FOR BREAST CARCINOMA PROGNOSTIC INDICATOR ANALYSIS, SEPARATE ADDENDUM REPORT TO FOLLOW. Procedures/Addenda STF- [id number] BY IHC Status: Emelia Estrada MD on 2017 Interpretation [facility] IHC Quantitative Breast Panel TEST NAME: RESULTS: INTERNAL CONTROLS:  
ESTROGEN RECEPTOR: Positive (99.8%) Present, Positive PROGESTERONE RECEPTOR: Positive (96.6%) Present, Positive HER-2/VLADISLAV: Equivocal (2+) Percentage of Cells with Uniform Intense Complete Membrane Stainin% FISH Her-2/vladislav has been ordered and will be reported in an addendum. See [facility] in [facility]. ST[facility] - [id number] BY FISH Status: Signed Out Faraz Nicolas MD on 8/15/2017 Interpretation [facility] FISH Analysis/HER2 Breast: RESULTS: Equivocal  
See full report in [facility]. ST[facility] - [id number] BY FISH Status: Signed Out Juwan Perez M.D., Ph.D. on 10/5/2017 Interpretation [facility] HER2 Breast Equivocal FISH Panel Result:  
Negative. See full report in [facility]. LABORATORY:  
Lab Results Component Value Date/Time Sodium 143 2018 12:28 PM  
 Potassium 4.4 2018 12:28 PM  
 Chloride 108 (H) 2018 12:28 PM  
 CO2 28 2018 12:28 PM  
 Anion gap 7 2018 12:28 PM  
 Glucose 88 2018 12:28 PM  
 BUN 12 2018 12:28 PM  
 Creatinine 0.70 2018 12:28 PM  
 GFR est AA >60 2018 12:28 PM  
 GFR est non-AA >60 2018 12:28 PM  
 Calcium 9.2 2018 12:28 PM  
 Albumin 3.4 2018 12:28 PM  
 Protein, total 7.1 2018 12:28 PM  
 Globulin 3.7 (H) 2018 12:28 PM  
 A-G Ratio 0.9 (L) 2018 12:28 PM  
 AST (SGOT) 16 2018 12:28 PM  
 ALT (SGPT) 26 2018 12:28 PM  
 
Lab Results Component Value Date/Time  WBC 6.5 2018 12:00 PM  
 HGB 13.7 2018 12:00 PM  
 HCT 41.4 2018 12:00 PM  
 PLATELET 016  12:00 PM  
 
 
RADIOLOGY:   
 
17: MRI of the Breasts 
  INDICATION:  New diagnosis of breast cancer 
  
Standard MRI sequences were obtained through the breasts in multiple planes. Images were obtained before and after intravenous infusion of 20 ml of 
Multihance.  
  
FINDINGS: 
There is an irregular enhancing 2.2 cm mass at 9:00 in the right breast 
consistent with known breast tumor. The mass measures 2.2 cm in diameter. There is a small focus of enhancement immediately posterior to the mass in the 
lateral right breast, likely local extension of tumor. Total extent is 2.9 cm. No additional tumor is seen in the right breast.  There is no significant right 
axillary adenopathy. 
  
There is mild background enhancement of the left breast.  There is no 
significant left axillary adenopathy. There are no chest wall lesions. 
  
IMPRESSION:  
1.  2 cm enhancing mass in the anterior right breast at 9:00, posterior 
extension with a total tumor extent of 2.9 cm. 
2.  No additional tumor in either breast. 
  
BI-RADS Assessment Category 6: Known Biopsy Proven Malignancy 07/26/17: DIAGNOSTIC DIGITAL BILATERAL MAMMOGRAPHY AND BILATERAL BREAST ULTRASOUND  
  
CLINICAL INDICATION: Patient reports approximately one week of right palpable 
lump with skin thickening and nipple inversion, remote left benign excision 
  
COMPARISON: 3/23/2009 from [address] [place] [address] [place]:  
  
Mammogram: Digital diagnostic mammography was performed, and is interpreted in 
conjunction with a computer assisted detection (CAD) system.   
  
Standard views bilaterally demonstrate scattered glandular densities. Additional 
bilateral mediolateral and compression magnification views were performed. On 
the right there is an irregular mass in the anterior slightly lateral breast 
corresponding to the area of symptoms. On the left there is a small oval 
circumscribed mass anteriorly at 3:00 new since prior.  Ultrasound is recommended 
to further evaluate these areas. 
  
Otherwise there are scattered bilateral typically benign calcifications with no 
suspicious cluster. There is no skin thickening or distortion on the left. Circumscribed 1.7 cm fibroadenoma in the right anterior slightly medial breast 
is unchanged. 
  
Ultrasound: Real time targeted sonography was performed of the bilateral breast 
by the radiologist and sonographer. Corresponding to the mammographic finding on 
the left at 3:00 3 cm from the nipple is a 0.9 cm benign cyst. Corresponding to 
the area of symptoms on the right at 9:00 2 cm from the nipple is an irregular 
heterogeneously hypoechoic 2.5 x 1.7 x 1.4 cm mass with posterior shadowing and 
no definite Doppler flow. The right axilla demonstrates no obvious 
lymphadenopathy. 
  
IMPRESSION:  
1. Right 9:00 palpable 2.5 cm mass, with nipple retraction. Recommend 
ultrasound-guided biopsy. 2. No other suspicious finding on either side. Benign left 3:00 small cyst 
noted. DIAGNOSTIC DIGITAL BILATERAL MAMMOGRAPHY  07/27/2018 
  
CLINICAL INDICATION: Surveillance, patient had right lumpectomy on 8/25/2017 for 
9:00 breast cancer and right axillary lymph node removal demonstrating 
macrometastatic carcinoma. Subsequent radiation and hormone therapy. Patient 
with no new problems today; maternal aunt with breast cancer age 76. 
  
COMPARISON: 8/25/2017 and others going back to 3/23/2009. Also breast MRI 
3/2/2017.  
  
FINDINGS:  
  
Mammogram: Digital diagnostic mammography was performed, and is interpreted in 
conjunction with a computer assisted detection (CAD) system.   
  
Standard views bilaterally demonstrate scattered glandular densities. Additional 
right mediolateral and compression magnification views were performed. On the 
left are stable small circumscribed nodules as well as scattered typically 
benign calcifications. On the right there is mild expected postoperative 
scarring and distortion, minimal scattered typically benign calcifications.  No 
suspicious developing mass, calcification cluster, nonsurgical distortion or 
undue skin thickening is seen on either side. 
  
IMPRESSION: Benign right lumpectomy change. No evidence of malignancy in either 
breast. 
  
Recommend bilateral screening mammography in one year unless other imaging is 
clinically warranted in the interval. A reminder letter will be scheduled. This 
was reported to the patient at the time of interpretation. 
  
BI-RADS Assessment Category 2: Benign finding IMPRESSION:  Sandra Phillips is a 75 y.o. female with Grade 1 IDC of the right breast, ER/IL strongly positive, HER2 equivocal by IHC and FISH (on biopsy and surgical specimen). 1.6 cm resection specimen with 1 of 1 lymph node positive for sentinel node metastases. [id number] (stage IIA). Her oncotype has returned at a score was low. It was determined by Dr. Kizzy Sandoval, her oncologist, that she will not need chemotherapy. She completed radiation therapy of the right breast, 01/04/2018. She was started on Arimidex after completing radiation therapy under the direction of Dr. Kizzy Sandoval. Dexa scan in December 2017 shows osteopenia. She was started on calcium and vitamin D supplementation at that time. Ms Erwin Anderson is now 6 months after completing radiation therapy of the right breast. She is recovering as anticipated from treatment. Recent mammogram is negative for malignancy. Tolerating Arimidex at this time. She reports having a rash across the abdomen that itchy. She was seen by her primary care physician and treated with steroids. The rash has resolved, but now reports having swelling of the corners of her mouth, 1-2 times a week. She states the swelling resolves after a short period of time and is non itchy or painful. Unclear if this could be related to  Arimidex. I have asked to call Dr. Lisa Olguin office and make them aware of her symptoms. Plan: 
1) Endocrine therapy under the direction of Dr. Kizzy Sandoval 2) Continue calcium and vitamin D supplementation. Repeat DEXA, 12/2019 3) Will schedule annual mammogram for July 2019 3) I spent 25 minutes in the care of Ms Lenna Canavan today, over 50% of which was in direct counseling and ongoing management of her IDC breast cancer. We discussed ongoing surveillance for her breast cancer which would include follow up every 6 months  x2 years then yearly generally. All questions were answered to the best of my ability. RTC 6 months. Terri Napier NP  
July 31, 2018 Portions of this note were copied from prior encounters and reviewed for accuracy, currency, and represent documentation and tasks completed during this encounter. I verify and attest these portions to be unchanged from prior visits.

## 2018-07-31 NOTE — PROGRESS NOTES
Pt here today for FUP post RT to the right breast and right clavicle which ended 1/4/18. Pt is s/p a right lumpectomy 8/25/17. She is taking Arimidex as ordered. Her 7/27/18 Mammogram was negative. Pt will return in 6 months for FUP. The one year Mammogram is ordered for 7/2019.

## 2018-08-03 ENCOUNTER — HOSPITAL ENCOUNTER (OUTPATIENT)
Dept: LAB | Age: 73
Discharge: HOME | End: 2018-08-03
Payer: MEDICARE

## 2018-08-03 DIAGNOSIS — C50.511 MALIGNANT NEOPLASM OF LOWER-OUTER QUADRANT OF RIGHT FEMALE BREAST, UNSPECIFIED ESTROGEN RECEPTOR STATUS (HCC): ICD-10-CM

## 2018-08-03 LAB
ALBUMIN SERPL-MCNC: 3.4 G/DL (ref 3.2–4.6)
ALBUMIN/GLOB SERPL: 0.9 {RATIO}
ALP SERPL-CCNC: 104 U/L (ref 50–136)
ALT SERPL-CCNC: 33 U/L (ref 12–65)
ANION GAP SERPL CALC-SCNC: 4 MMOL/L
AST SERPL-CCNC: 20 U/L (ref 15–37)
BASOPHILS # BLD: 0.1 K/UL (ref 0–0.2)
BASOPHILS NFR BLD: 1 % (ref 0–2)
BILIRUB SERPL-MCNC: 0.4 MG/DL (ref 0.2–1.1)
BUN SERPL-MCNC: 15 MG/DL (ref 8–23)
CALCIUM SERPL-MCNC: 8.9 MG/DL (ref 8.3–10.4)
CHLORIDE SERPL-SCNC: 108 MMOL/L (ref 98–107)
CO2 SERPL-SCNC: 29 MMOL/L (ref 21–32)
CREAT SERPL-MCNC: 0.8 MG/DL (ref 0.6–1)
DIFFERENTIAL METHOD BLD: ABNORMAL
EOSINOPHIL # BLD: 0.1 K/UL (ref 0–0.8)
EOSINOPHIL NFR BLD: 2 % (ref 0.5–7.8)
ERYTHROCYTE [DISTWIDTH] IN BLOOD BY AUTOMATED COUNT: 14.2 % (ref 11.9–14.6)
GLOBULIN SER CALC-MCNC: 3.7 G/DL
GLUCOSE SERPL-MCNC: 101 MG/DL (ref 65–100)
HCT VFR BLD AUTO: 41.9 % (ref 35.8–46.3)
HGB BLD-MCNC: 13.4 G/DL (ref 11.7–15.4)
LYMPHOCYTES # BLD: 0.6 K/UL (ref 0.5–4.6)
LYMPHOCYTES NFR BLD: 13 % (ref 13–44)
MCH RBC QN AUTO: 30.2 PG (ref 26.1–32.9)
MCHC RBC AUTO-ENTMCNC: 32 G/DL (ref 31.4–35)
MCV RBC AUTO: 94.6 FL (ref 79.6–97.8)
MONOCYTES # BLD: 0.4 K/UL (ref 0.1–1.3)
MONOCYTES NFR BLD: 9 % (ref 4–12)
NEUTS SEG # BLD: 3.4 K/UL (ref 1.7–8.2)
NEUTS SEG NFR BLD: 75 % (ref 43–78)
NRBC # BLD: 0 K/UL (ref 0–0.2)
PLATELET # BLD AUTO: 197 K/UL (ref 150–450)
PMV BLD AUTO: 9.9 FL (ref 10.8–14.1)
POTASSIUM SERPL-SCNC: 4 MMOL/L (ref 3.5–5.1)
PROT SERPL-MCNC: 7.1 G/DL (ref 6.3–8.2)
RBC # BLD AUTO: 4.43 M/UL (ref 4.05–5.25)
SODIUM SERPL-SCNC: 141 MMOL/L (ref 136–145)
WBC # BLD AUTO: 4.5 K/UL (ref 4.3–11.1)

## 2018-08-03 PROCEDURE — 80053 COMPREHEN METABOLIC PANEL: CPT

## 2018-08-03 PROCEDURE — 85025 COMPLETE CBC W/AUTO DIFF WBC: CPT

## 2018-08-03 PROCEDURE — 36415 COLL VENOUS BLD VENIPUNCTURE: CPT

## 2018-09-05 PROBLEM — I10 ESSENTIAL HYPERTENSION: Status: ACTIVE | Noted: 2018-09-05

## 2019-02-04 ENCOUNTER — HOSPITAL ENCOUNTER (OUTPATIENT)
Dept: LAB | Age: 74
Discharge: HOME | End: 2019-02-04
Payer: MEDICARE

## 2019-02-04 DIAGNOSIS — C50.511 MALIGNANT NEOPLASM OF LOWER-OUTER QUADRANT OF RIGHT FEMALE BREAST, UNSPECIFIED ESTROGEN RECEPTOR STATUS (HCC): ICD-10-CM

## 2019-02-04 LAB
ALBUMIN SERPL-MCNC: 3.3 G/DL (ref 3.2–4.6)
ALBUMIN/GLOB SERPL: 0.9 {RATIO} (ref 1.2–3.5)
ALP SERPL-CCNC: 129 U/L (ref 50–136)
ALT SERPL-CCNC: 66 U/L (ref 12–65)
ANION GAP SERPL CALC-SCNC: 3 MMOL/L (ref 7–16)
AST SERPL-CCNC: 16 U/L (ref 15–37)
BASOPHILS # BLD: 0.1 K/UL (ref 0–0.2)
BASOPHILS NFR BLD: 1 % (ref 0–2)
BILIRUB SERPL-MCNC: 0.3 MG/DL (ref 0.2–1.1)
BUN SERPL-MCNC: 22 MG/DL (ref 8–23)
CALCIUM SERPL-MCNC: 8.9 MG/DL (ref 8.3–10.4)
CHLORIDE SERPL-SCNC: 109 MMOL/L (ref 98–107)
CO2 SERPL-SCNC: 30 MMOL/L (ref 21–32)
CREAT SERPL-MCNC: 0.85 MG/DL (ref 0.6–1)
DIFFERENTIAL METHOD BLD: NORMAL
EOSINOPHIL # BLD: 0.1 K/UL (ref 0–0.8)
EOSINOPHIL NFR BLD: 3 % (ref 0.5–7.8)
ERYTHROCYTE [DISTWIDTH] IN BLOOD BY AUTOMATED COUNT: 13.4 % (ref 11.9–14.6)
GLOBULIN SER CALC-MCNC: 3.5 G/DL (ref 2.3–3.5)
GLUCOSE SERPL-MCNC: 95 MG/DL (ref 65–100)
HCT VFR BLD AUTO: 39 % (ref 35.8–46.3)
HGB BLD-MCNC: 12.6 G/DL (ref 11.7–15.4)
IMM GRANULOCYTES # BLD AUTO: 0 K/UL (ref 0–0.5)
IMM GRANULOCYTES NFR BLD AUTO: 0 % (ref 0–5)
LYMPHOCYTES # BLD: 1 K/UL (ref 0.5–4.6)
LYMPHOCYTES NFR BLD: 20 % (ref 13–44)
MCH RBC QN AUTO: 30.7 PG (ref 26.1–32.9)
MCHC RBC AUTO-ENTMCNC: 32.3 G/DL (ref 31.4–35)
MCV RBC AUTO: 94.9 FL (ref 79.6–97.8)
MONOCYTES # BLD: 0.4 K/UL (ref 0.1–1.3)
MONOCYTES NFR BLD: 9 % (ref 4–12)
NEUTS SEG # BLD: 3.2 K/UL (ref 1.7–8.2)
NEUTS SEG NFR BLD: 67 % (ref 43–78)
NRBC # BLD: 0 K/UL (ref 0–0.2)
PLATELET # BLD AUTO: 194 K/UL (ref 150–450)
PMV BLD AUTO: 9.8 FL (ref 9.4–12.3)
POTASSIUM SERPL-SCNC: 4.1 MMOL/L (ref 3.5–5.1)
PROT SERPL-MCNC: 6.8 G/DL (ref 6.3–8.2)
RBC # BLD AUTO: 4.11 M/UL (ref 4.05–5.25)
SODIUM SERPL-SCNC: 142 MMOL/L (ref 136–145)
WBC # BLD AUTO: 4.8 K/UL (ref 4.3–11.1)

## 2019-02-04 PROCEDURE — 85025 COMPLETE CBC W/AUTO DIFF WBC: CPT

## 2019-02-04 PROCEDURE — 36415 COLL VENOUS BLD VENIPUNCTURE: CPT

## 2019-02-04 PROCEDURE — 80053 COMPREHEN METABOLIC PANEL: CPT

## 2019-02-05 ENCOUNTER — HOSPITAL ENCOUNTER (OUTPATIENT)
Dept: RADIATION ONCOLOGY | Age: 74
Discharge: HOME | End: 2019-02-05
Payer: MEDICARE

## 2019-02-05 VITALS
SYSTOLIC BLOOD PRESSURE: 170 MMHG | RESPIRATION RATE: 16 BRPM | TEMPERATURE: 97.6 F | OXYGEN SATURATION: 96 % | DIASTOLIC BLOOD PRESSURE: 80 MMHG | HEART RATE: 68 BPM | BODY MASS INDEX: 40.87 KG/M2 | WEIGHT: 230.7 LBS

## 2019-02-05 PROCEDURE — 99211 OFF/OP EST MAY X REQ PHY/QHP: CPT

## 2019-02-05 NOTE — PROGRESS NOTES
Patient: Tiburcio Heimlich MRN: [id number]  SSN: [id number] YOB: 1945  Age: 75 y.o. Sex: female Other Providers:    MD Maximus Rubio MD 
 
CHIEF COMPLAINT: Breast cancer DIAGNOSIS: Grade 1 IDC of the right breast, ER/DC strongly positive, HER2 equivocal by IHC and FISH (on biopsy and surgical specimen). 1.6 cm resection specimen with 1 of 1 lymph node positive for sentinel node metastases. [id number] (stage IIA) PREVIOUS TREATMENT:   
1) 07/31/2017: Biopsy 2) 08/25/2017: lumpectomy and SLN biopsy 3) Radiation of the right breast, right Sup CLav. DOSE:  5000 cGy in 25 fractions right sup clav, 5000 cGy in 25 fractions right breast, 1000 cGy in 5 fractions right breast boost. TREATMENT DATES:  11/20/2017 - 01/04/2018 4) Arimidex, 01/2018. HISTORY OF PRESENT ILLNESS:  Tiburcio Heimlich is a 75 y.o. female initially seen by Dr. Rita Vargas at the request of Dr. Rach Mohan regarding the role of radiation therapy in further treatment of this Stage IIA right breast cancer. In 07/17 she was found to have a right breast lump with nipple inversion. She underwent diagnostic mammogram and ultrasound on 07/26/17 that demonstrated  A 2.5 cm mass at the 9 o'clock position of the right breast with nipple retraction. There were no other suspicious findings. Biopsy on 07/31/17 showed low grade invasive ductal carcinoma, ER/DC strongly positive, HER-2 equivocal by IHC and FISH. MRI of the breasts on 08/02/17 showed a 2 cm enhancing mass in the anterior right breast at the 9 o'clock position, posterior extension with a total tumor extent of 2.9 cm. No additional tumor was seen in either breast.  No significant axillary adenopathy was identified. She underwent lumpectomy and sentinel lymph node biopsy under the direction of Dr. Dejah Gardner on 70/50/56.   Pathology showed  INFILTRATING DUCT CARCINOMA WITH LOBULAR FEATURES, LOW GRADE (WELL DIFFERENTIATED) MEASURING APPROXIMATELY 1.6 X 1.5 X 1.5 CM. EXTENSIVE LYMPHOVASCULAR INVASION. AREA OF LYMPHOVASCULAR INVASION IS PRESENT LESS THAN 0.1 CM FROM MARKED SUPERIOR MARGIN. INFILTRATING TUMOR IS PRESENT APPROXIMATELY 0.3 CM FROM MARKED INFERIOR MARGIN. OTHER MARGINS ARE GREATER THAN 1 CM FROM TUMOR. SLN biopsy demonstrated MACROMETASTATIC CARCINOMA TO ONE OF ONE LYMPH NODES WITH ASSOCIATED EXTRACAPSULAR EXTENSION. Dr. Gloria Sanford recommended her to testing on the surgical specimen. This returned a negative result. Her case was discussed at breast tumor board. Based on the results of  it was felt that completion axillary dissection was not required. Dr. Gloria Sanford also recommended MammaPrint testing to document whether she was at low or high risk. If low risk, endocrine therapy alone would be recommended. However, if she was found to be high risk, adjuvant chemotherapy with ddAC-T would be recommended. MammaPrint did return a low-risk result and she was recommended not to undergo chemotherapy. Regardless of these results, radiation therapy was recommended. INTERVAL HISTORY: Ms Inocencio Hassan returns today 13 months after completing radiation therapy of the right breast.  
 
Ms. Inocencio Hassan continues to do well. She has no residual pain. Mammogram in July 2018 showed no evidence of malignancy. Tolerating Arimidex. Her energy is good. Overall doing great with no complaints. PAST MEDICAL HISTORY:   
Past Medical History:  
Diagnosis Date  Anxiety  BMI 40.0-44.9, adult ([facility]  Cancer ([facility] 08/2017  
 right breast   
 Depression  Diverticulosis  Essential hypertension 9/5/2018  GERD (gastroesophageal reflux disease)   
 zantac BID  Nausea & vomiting   
 post-op N/V  
 Osteoarthritis  Sleep apnea   
 c-pap The patient denies history of collagen vascular diseases, pacemaker insertion, prior radiation or prior chemotherapy. PAST SURGICAL HISTORY:  
Past Surgical History: Procedure Laterality Date  HX BREAST BIOPSY Left 1990's  HX BREAST BIOPSY Right 07/31/2017 US Bx  
 HX BREAST BIOPSY Right 8/25/2017 RIGHT BREAST NEEDLE LOCALIZED BIOPSY performed by Maranda Mireles MD at [address] HX BREAST LUMPECTOMY Right 8/25/2017 RIGHT BREAST LUMPECTOMY performed by Maranda Mireles MD at [address] HX KNEE REPLACEMENT Bilateral 2015  HX PARTIAL HYSTERECTOMY    
 vaginal  
 HX TUBAL LIGATION  1979 MEDICATIONS:  
 
Current Outpatient Medications:  
  anastrozole (ARIMIDEX) 1 mg tablet, Take 1 mg by mouth daily. , Disp: 90 Tab, Rfl: 3 
  olmesartan (BENICAR) 40 mg tablet, Take 1 Tab by mouth daily. , Disp: 30 Tab, Rfl: 11 
  FLUoxetine (PROZAC) 40 mg capsule, TAKE ONE CAPSULE BY MOUTH ONCE DAILY, Disp: 30 Cap, Rfl: 11 
  triamcinolone (ARISTOCORT) 0.5 % topical cream, Apply  to affected area two (2) times a day. use thin layer, Disp: 30 g, Rfl: 1   cetirizine (ZYRTEC) 10 mg tablet, Take 1 Tab by mouth nightly., Disp: 30 Tab, Rfl: prn 
  naltrexone-buPROPion (CONTRAVE) 8-90 mg TbER ER tablet, First Month: Contrave 8mg/90mg #70 Week 1 : 1 Tab AM Week 2 : 1 Tab AM, 1 Tab PM Week 3 : 2 Tab AM, 1 Tab PM Week 4 : 2 Tab AM, 2 Tab PM Week 5 and Beyond: Contrave 8mg/90mg #120  2 Tab AM, 2 Tab PM    Indications: WEIGHT LOSS MANAGEMENT FOR OBESE PATIENT (BMI >= 30), Disp: 70 Tab, Rfl: 5 
  buPROPion XL (WELLBUTRIN XL) 150 mg tablet, Take 1 Tab by mouth every morning., Disp: 30 Tab, Rfl: 11 
  CALCIUM PO, Take 600 mg by mouth two (2) times a day., Disp: , Rfl:  
  CHOLECALCIFEROL, VITAMIN D3, (VITAMIN D3 PO), Take 1,000 Units by mouth daily. , Disp: , Rfl:  
  docusate sodium (STOOL SOFTENER) 100 mg tab, Take 1 Tab by mouth daily as needed. Indications: constipation, Disp: , Rfl:  
  cranberry extract 450 mg tab tablet, Take 450 mg by mouth daily. , Disp: , Rfl:  
  cpap machine kit, by Does Not Apply route.  15 cm, Disp: , Rfl:  
   ibuprofen (MOTRIN) 200 mg tablet, Take 800 mg by mouth every eight (8) hours as needed for Pain., Disp: , Rfl:  
  aspirin (ASPIRIN) 325 mg tablet, Take 975 mg by mouth as needed for Pain., Disp: , Rfl:  
  ranitidine (ZANTAC) 150 mg tablet, Take 150 mg by mouth two (2) times a day. Take / use AM day of surgery  per anesthesia protocols. Indications: gastroesophageal reflux disease, Disp: , Rfl: ALLERGIES:  
Allergies Allergen Reactions  Latex Rash Blisters SOCIAL HISTORY:  
Social History Socioeconomic History  Marital status:  Spouse name: Not on file  Number of children: Not on file  Years of education: Not on file  Highest education level: Not on file Social Needs  Financial resource strain: Not on file  Food insecurity - worry: Not on file  Food insecurity - inability: Not on file  Transportation needs - medical: Not on file  Transportation needs - non-medical: Not on file Occupational History  Occupation: retired Tobacco Use  Smoking status: Never Smoker  Smokeless tobacco: Never Used Substance and Sexual Activity  Alcohol use: No  
 Drug use: No  
 Sexual activity: Not on file Other Topics Concern [address] Service Not Asked  Blood Transfusions Not Asked  Caffeine Concern Not Asked Comment: 3-5 cups per day  Occupational Exposure Not Asked [place] Hazards Not Asked  Sleep Concern Not Asked  Stress Concern Not Asked  Weight Concern Not Asked  Special Diet Not Asked  Back Care Not Asked  Exercise Yes Comment: 1-3 days per week  Bike Helmet Not Asked  Seat Belt Not Asked  Self-Exams Not Asked Social History Narrative  Not on file FAMILY HISTORY:  
Family History Problem Relation Age of Onset  Depression Mother  Heart Attack Father   
     before age 61  Alcohol abuse Brother  Asthma Brother  Diabetes Brother  No Known Problems Brother  Breast Cancer Neg Hx REVIEW OF SYSTEMS: Please see the completed review of systems sheet in the chart that I have reviewed today. PHYSICAL EXAMINATION:  
ECOG Performance status 0 
VITAL SIGNS: Blood pressure 170/80  Pulse 68  Temperature 97.6  Weight  230.7 GENERAL: The patient is well-developed, ambulatory, alert and in no acute distress. BREASTS: Examination of the unaffected breast reveals no dominant nodules or masses. The lumpectomy cavity appears well healed with good aesthetics and symmetry - unchanged. PATHOLOGY:   
 
08/25/17:  
 DIAGNOSIS  
A: RIGHT BREAST LUMPECTOMY: INFILTRATING DUCT CARCINOMA WITH LOBULAR FEATURES, LOW GRADE (WELL DIFFERENTIATED) MEASURING APPROXIMATELY 1.6 X 1.5 X 1.5 CM. EXTENSIVE LYMPHOVASCULAR INVASION. AREA OF LYMPHOVASCULAR INVASION IS PRESENT LESS THAN 0.1 CM FROM MARKED SUPERIOR MARGIN. INFILTRATING TUMOR IS PRESENT APPROXIMATELY 0.3 CM FROM MARKED INFERIOR MARGIN. OTHER MARGINS ARE GREATER THAN 1 CM FROM TUMOR. DEFINITE IN SITU COMPONENT IS NOT IDENTIFIED. SEE COMMENT. B: SENTINEL NODE: MACROMETASTATIC CARCINOMA TO ONE OF ONE LYMPH NODES WITH ASSOCIATED EXTRACAPSULAR EXTENSION. Comment Infiltrating carcinoma in this specimen is similar to that in prior core biopsy [id number] which has estrogen receptors of 100%, progesterone receptors of 97%, Her-2 2+ equivocal by IHC and equivocal by FISH. Because of Her-2 studies, FISH for Her-2 will be repeated on block A5. Estrogen receptors and progesterone receptors can be repeated if clinically indicated. 07/31/17:  
 DIAGNOSIS RIGHT BREAST AT 9 O'CLOCK, 2 CM FROM NIPPLE, ULTRASOUND GUIDED CORE NEEDLE BIOPSY:  
INVASIVE DUCTAL CARCINOMA. ANATOMIC SITE: Right breast at 9 o'clock, 2 cm from nipple. PROCEDURE: Core needle biopsy. HISTOLOGIC TYPE: Ductal carcinoma, NOS. SIZE: At least 1.3 cm in greatest dimension. CHITRA MODIFICATION OF BLOOM-CESPEDES GRADE:  
ARCHITECTURAL SCORE: 3/3. NUCLEAR SCORE: 2/3. MITOTIC SCORE: 1/3. TOTAL SCORE: 6/9 = Grade 2/3. IN-SITU COMPONENT: Not identified. LYMPHOVASCULAR INVASION: Not identified. PERINEURAL INVASION: Present. MICROCALCIFICATIONS: Present. COLD ISCHEMIC TIME: Less than 1 minute. TOTAL TIME IN FORMALIN: 11 hours 52 minutes. BLOCK A1 SENT FOR BREAST CARCINOMA PROGNOSTIC INDICATOR ANALYSIS, SEPARATE ADDENDUM REPORT TO FOLLOW. Procedures/Addenda STF- ER/IN/[id number] BY IHC Status: Patricia Hernandez MD on 2017 Interpretation [employer] IHC Quantitative Breast Panel TEST NAME: RESULTS: INTERNAL CONTROLS:  
ESTROGEN RECEPTOR: Positive (99.8%) Present, Positive PROGESTERONE RECEPTOR: Positive (96.6%) Present, Positive HER-2/VLADISLAV: Equivocal (2+) Percentage of Cells with Uniform Intense Complete Membrane Stainin% FISH Her-2/vladislav has been ordered and will be reported in an addendum. See full report in [facility]. STF - [id number] BY FISH Status: Signed Out Faraz Guerra MD on 8/15/2017 Interpretation [employer] FISH Analysis/HER2 Breast: RESULTS: Equivocal  
See full report in [facility]. STF - [id number] BY FISH Status: Signed Out Juwan Matos M.D., Ph.D. on 10/5/2017 Interpretation [employer] HER2 Breast Equivocal FISH Panel Result:  
Negative. See full report in [facility]. LABORATORY:  
Lab Results Component Value Date/Time  Sodium 142 2019 09:42 AM  
 Potassium 4.1 2019 09:42 AM  
 Chloride 109 (H) 2019 09:42 AM  
 CO2 30 2019 09:42 AM  
 Anion gap 3 (L) 2019 09:42 AM  
 Glucose 95 2019 09:42 AM  
 BUN 22 2019 09:42 AM  
 Creatinine 0.85 2019 09:42 AM  
 GFR est AA >60 2019 09:42 AM  
 GFR est non-AA >60 2019 09:42 AM  
 Calcium 8.9 2019 09:42 AM  
 Albumin 3.3 2019 09:42 AM  
 Protein, total 6.8 2019 09:42 AM  
 Globulin 3.5 2019 09:42 AM  
 A-G Ratio 0.9 (L) 02/04/2019 09:42 AM  
 AST (SGOT) 16 02/04/2019 09:42 AM  
 ALT (SGPT) 66 (H) 02/04/2019 09:42 AM  
 
Lab Results Component Value Date/Time WBC 4.8 02/04/2019 09:42 AM  
 HGB 12.6 02/04/2019 09:42 AM  
 HCT 39.0 02/04/2019 09:42 AM  
 PLATELET 002 65/25/5626 09:42 AM  
 
 
RADIOLOGY:   
 
08/02/17: MRI of the Breasts 
  INDICATION:  New diagnosis of breast cancer 
  
Standard MRI sequences were obtained through the breasts in multiple planes. Images were obtained before and after intravenous infusion of 20 ml of 
Multihance.  
  
FINDINGS: 
There is an irregular enhancing 2.2 cm mass at 9:00 in the right breast 
consistent with known breast tumor. The mass measures 2.2 cm in diameter. There is a small focus of enhancement immediately posterior to the mass in the 
lateral right breast, likely local extension of tumor. Total extent is 2.9 cm. No additional tumor is seen in the right breast.  There is no significant right 
axillary adenopathy. 
  
There is mild background enhancement of the left breast.  There is no 
significant left axillary adenopathy. There are no chest wall lesions. 
  
IMPRESSION:  
1.  2 cm enhancing mass in the anterior right breast at 9:00, posterior 
extension with a total tumor extent of 2.9 cm. 
2.  No additional tumor in either breast. 
  
BI-RADS Assessment Category 6: Known Biopsy Proven Malignancy 07/26/17: DIAGNOSTIC DIGITAL BILATERAL MAMMOGRAPHY AND BILATERAL BREAST ULTRASOUND  
  
CLINICAL INDICATION: Patient reports approximately one week of right palpable 
lump with skin thickening and nipple inversion, remote left benign excision 
  
[facility]  
  
Mammogram: Digital diagnostic mammography was performed, and is interpreted in 
conjunction with a computer assisted detection (CAD) system.   
  
Standard views bilaterally demonstrate scattered glandular densities.  Additional 
 bilateral mediolateral and compression magnification views were performed. On 
the right there is an irregular mass in the anterior slightly lateral breast 
corresponding to the area of symptoms. On the left there is a small oval 
circumscribed mass anteriorly at 3:00 new since prior. Ultrasound is recommended 
to further evaluate these areas. 
  
Otherwise there are scattered bilateral typically benign calcifications with no 
suspicious cluster. There is no skin thickening or distortion on the left. Circumscribed 1.7 cm fibroadenoma in the right anterior slightly medial breast 
is unchanged. 
  
Ultrasound: Real time targeted sonography was performed of the bilateral breast 
by the radiologist and sonographer. Corresponding to the mammographic finding on 
the left at 3:00 3 cm from the nipple is a 0.9 cm benign cyst. Corresponding to 
the area of symptoms on the right at 9:00 2 cm from the nipple is an irregular 
heterogeneously hypoechoic 2.5 x 1.7 x 1.4 cm mass with posterior shadowing and 
no definite Doppler flow. The right axilla demonstrates no obvious 
lymphadenopathy. 
  
IMPRESSION:  
1. Right 9:00 palpable 2.5 cm mass, with nipple retraction. Recommend 
ultrasound-guided biopsy. 2. No other suspicious finding on either side. Benign left 3:00 small cyst 
noted. DIAGNOSTIC DIGITAL BILATERAL MAMMOGRAPHY  07/27/2018 
  
CLINICAL INDICATION: Surveillance, patient had right lumpectomy on 8/25/2017 for 
9:00 breast cancer and right axillary lymph node removal demonstrating 
macrometastatic carcinoma. Subsequent radiation and hormone therapy. Patient 
with no new problems today; maternal aunt with breast cancer age 76. 
  
COMPARISON: 8/25/2017 and others going back to 3/23/2009.  Also breast MRI 
3/2/2017.  
  
FINDINGS:  
  
Mammogram: Digital diagnostic mammography was performed, and is interpreted in 
conjunction with a computer assisted detection (CAD) system.   
  
 Standard views bilaterally demonstrate scattered glandular densities. Additional 
right mediolateral and compression magnification views were performed. On the 
left are stable small circumscribed nodules as well as scattered typically 
benign calcifications. On the right there is mild expected postoperative 
scarring and distortion, minimal scattered typically benign calcifications. No 
suspicious developing mass, calcification cluster, nonsurgical distortion or 
undue skin thickening is seen on either side. 
  
IMPRESSION: Benign right lumpectomy change. No evidence of malignancy in either 
breast. 
  
Recommend bilateral screening mammography in one year unless other imaging is 
clinically warranted in the interval. A reminder letter will be scheduled. This 
was reported to the patient at the time of interpretation. 
  
BI-RADS Assessment Category 2: Benign finding IMPRESSION:  Rogenia Kanner is a 75 y.o. female with Grade 1 IDC of the right breast, ER/AZ strongly positive, HER2 equivocal by IHC and FISH (on biopsy and surgical specimen). 1.6 cm resection specimen with 1 of 1 lymph node positive for sentinel node metastases. [id number] (stage IIA). Her oncotype has returned at a score was low. It was determined by Dr. Uday Edwards, her oncologist, that she will not need chemotherapy. She completed radiation therapy of the right breast, 01/04/2018. She was started on Arimidex after completing radiation therapy under the direction of Dr. Uday Edwards. Dexa scan in December 2017 shows osteopenia. She was started on calcium and vitamin D supplementation at that time. Ms Jonh Flowers is now 13 months after completing radiation therapy of the right breast. She denies any residual side effects related to radiation. Up to date with mammography - annual scheduled, 7/29/19. Tolerating Arimidex. Plan: 
1) Endocrine therapy under the direction of Dr. Uday Edwards 2) Osteopenia: Continue calcium and vitamin D supplementation.  Repeat ROJELIO, 12/2019 
3) I spent 25 minutes in the care of Ms Tay Méndez today, over 50% of which was in direct counseling and ongoing management of her IDC breast cancer. We discussed ongoing surveillance for her breast cancer which would include follow up every 6 months  x2 years generally. At that time, is no issues will most likely sign off from her care. All questions were answered to the best of my ability. RTC 6 months. Camilo Davenport NP February 5, 2019 Portions of this note were copied from prior encounters and reviewed for accuracy, currency, and represent documentation and tasks completed during this encounter. I verify and attest these portions to be unchanged from prior visits.

## 2019-02-05 NOTE — PROGRESS NOTES
Pt here today for FUP post RT to the right breast which ended 1/4/18. Pt is taking Arimidex as ordered. She is s/p a right lumpectomy on 8/25/17. The 7/27/18 Mammogram was negative. Her next Mammogram and Bone Density scan will be 7/29/2019. Pt has no c/o. She will have FUP with Dr. Gloria Sanford 8/5/2019.

## 2019-05-30 NOTE — BRIEF OP NOTE
BRIEF OPERATIVE NOTE    Date of Procedure: 8/25/2017   Preoperative Diagnosis: Malignant neoplasm of right female breast, unspecified site of breast ([facility] [C50.911]  Postoperative Diagnosis: Malignant neoplasm of right female breast, unspecified site of breast ([facility] [C50.911]    Procedure(s):  RIGHT BREAST LUMPECTOMY  RIGHT BREAST NEEDLE LOCALIZED BIOPSY  SENTINEL NODE BIOPSY WITH LYMPHATIC MAPPING  Surgeon(s) and Role:     * Shameka Hidalgo MD - Primary         Assistant Staff:       Surgical Staff:  Circ-1: Sadia Siddiqi RN  Circ-Relief: Roxane Cronin RN  Radiology Technician: Amber Orona, RT, R, CT  Scrub Tech-1: Tucker Conway  Scrub Tech-2: Josr Monsalve  Event Time In   Incision Start 1226   Incision Close 1325     Anesthesia: General   Estimated Blood Loss: 20ml  Specimens:   ID Type Source Tests Collected by Time Destination   1 : right breat lumpectomy Fresh Breast  Shameka Hidalgo MD 1/16/0691 [id number] Pathology   2 : sentinal node Fresh Breast  Shameka Hidalgo MD 0/22/8274 [id number] Pathology      Findings: breast mass   Complications: none  Implants: * No implants in log *
In order to meet Medicare requirements, the clinical documentation must support the information cited in the admission order.  Please be sure to provide detailed and clear documentation about the following in the admitting note/history and physical:

## 2019-07-18 ENCOUNTER — TELEPHONE (OUTPATIENT)
Dept: ONCOLOGY | Age: 74
End: 2019-07-18

## 2019-07-18 NOTE — TELEPHONE ENCOUNTER
Pt's  called [facility] line by accident to reschedule wife's appointment on 8/5 with Dr. Isidro Bernstein. LS let pt know a  would call them back. Message relayed to  [place].  7/18/19

## 2019-08-19 ENCOUNTER — HOSPITAL ENCOUNTER (OUTPATIENT)
Dept: LAB | Age: 74
Discharge: HOME | End: 2019-08-19
Payer: MEDICARE

## 2019-08-19 DIAGNOSIS — Z17.0 MALIGNANT NEOPLASM OF LOWER-OUTER QUADRANT OF RIGHT BREAST OF FEMALE, ESTROGEN RECEPTOR POSITIVE (HCC): ICD-10-CM

## 2019-08-19 DIAGNOSIS — C50.511 MALIGNANT NEOPLASM OF LOWER-OUTER QUADRANT OF RIGHT BREAST OF FEMALE, ESTROGEN RECEPTOR POSITIVE (HCC): ICD-10-CM

## 2019-08-19 LAB
ALBUMIN SERPL-MCNC: 3.4 G/DL (ref 3.2–4.6)
ALBUMIN/GLOB SERPL: 0.9 {RATIO} (ref 1.2–3.5)
ALP SERPL-CCNC: 98 U/L (ref 50–136)
ALT SERPL-CCNC: 28 U/L (ref 12–65)
ANION GAP SERPL CALC-SCNC: 7 MMOL/L (ref 7–16)
AST SERPL-CCNC: 16 U/L (ref 15–37)
BASOPHILS # BLD: 0.1 K/UL (ref 0–0.2)
BASOPHILS NFR BLD: 1 % (ref 0–2)
BILIRUB SERPL-MCNC: 0.2 MG/DL (ref 0.2–1.1)
BUN SERPL-MCNC: 22 MG/DL (ref 8–23)
CALCIUM SERPL-MCNC: 9.3 MG/DL (ref 8.3–10.4)
CHLORIDE SERPL-SCNC: 110 MMOL/L (ref 98–107)
CO2 SERPL-SCNC: 27 MMOL/L (ref 21–32)
CREAT SERPL-MCNC: 0.94 MG/DL (ref 0.6–1)
DIFFERENTIAL METHOD BLD: ABNORMAL
EOSINOPHIL # BLD: 0.2 K/UL (ref 0–0.8)
EOSINOPHIL NFR BLD: 3 % (ref 0.5–7.8)
ERYTHROCYTE [DISTWIDTH] IN BLOOD BY AUTOMATED COUNT: 13.2 % (ref 11.9–14.6)
GLOBULIN SER CALC-MCNC: 3.8 G/DL (ref 2.3–3.5)
GLUCOSE SERPL-MCNC: 121 MG/DL (ref 65–100)
HCT VFR BLD AUTO: 36.5 % (ref 35.8–46.3)
HGB BLD-MCNC: 11.8 G/DL (ref 11.7–15.4)
IMM GRANULOCYTES # BLD AUTO: 0 K/UL (ref 0–0.5)
IMM GRANULOCYTES NFR BLD AUTO: 0 % (ref 0–5)
LYMPHOCYTES # BLD: 0.9 K/UL (ref 0.5–4.6)
LYMPHOCYTES NFR BLD: 13 % (ref 13–44)
MCH RBC QN AUTO: 30.6 PG (ref 26.1–32.9)
MCHC RBC AUTO-ENTMCNC: 32.3 G/DL (ref 31.4–35)
MCV RBC AUTO: 94.6 FL (ref 79.6–97.8)
MONOCYTES # BLD: 0.5 K/UL (ref 0.1–1.3)
MONOCYTES NFR BLD: 8 % (ref 4–12)
NEUTS SEG # BLD: 5.4 K/UL (ref 1.7–8.2)
NEUTS SEG NFR BLD: 76 % (ref 43–78)
NRBC # BLD: 0 K/UL (ref 0–0.2)
PLATELET # BLD AUTO: 211 K/UL (ref 150–450)
PMV BLD AUTO: 10 FL (ref 9.4–12.3)
POTASSIUM SERPL-SCNC: 3.6 MMOL/L (ref 3.5–5.1)
PROT SERPL-MCNC: 7.2 G/DL (ref 6.3–8.2)
RBC # BLD AUTO: 3.86 M/UL (ref 4.05–5.25)
SODIUM SERPL-SCNC: 144 MMOL/L (ref 136–145)
WBC # BLD AUTO: 7.2 K/UL (ref 4.3–11.1)

## 2019-08-19 PROCEDURE — 36415 COLL VENOUS BLD VENIPUNCTURE: CPT

## 2019-08-19 PROCEDURE — 85025 COMPLETE CBC W/AUTO DIFF WBC: CPT

## 2019-08-19 PROCEDURE — 80053 COMPREHEN METABOLIC PANEL: CPT

## 2019-08-20 DIAGNOSIS — C50.911 MALIGNANT NEOPLASM OF RIGHT FEMALE BREAST, UNSPECIFIED ESTROGEN RECEPTOR STATUS, UNSPECIFIED SITE OF BREAST (HCC): ICD-10-CM

## 2019-08-20 DIAGNOSIS — C50.919 MALIGNANT NEOPLASM OF FEMALE BREAST, UNSPECIFIED ESTROGEN RECEPTOR STATUS, UNSPECIFIED LATERALITY, UNSPECIFIED SITE OF BREAST (HCC): Primary | ICD-10-CM

## 2019-08-21 ENCOUNTER — APPOINTMENT (OUTPATIENT)
Dept: RADIATION ONCOLOGY | Age: 74
End: 2019-08-21

## 2019-10-03 ENCOUNTER — HOSPITAL ENCOUNTER (OUTPATIENT)
Dept: MAMMOGRAPHY | Age: 74
Discharge: HOME | End: 2019-10-03
Attending: NURSE PRACTITIONER
Payer: MEDICARE

## 2019-10-03 DIAGNOSIS — C50.911 MALIGNANT NEOPLASM OF RIGHT FEMALE BREAST, UNSPECIFIED ESTROGEN RECEPTOR STATUS, UNSPECIFIED SITE OF BREAST (HCC): ICD-10-CM

## 2019-10-03 PROCEDURE — 77066 DX MAMMO INCL CAD BI: CPT

## 2019-10-08 ENCOUNTER — HOSPITAL ENCOUNTER (OUTPATIENT)
Dept: RADIATION ONCOLOGY | Age: 74
Discharge: HOME | End: 2019-10-08
Payer: MEDICARE

## 2019-10-08 VITALS
SYSTOLIC BLOOD PRESSURE: 127 MMHG | TEMPERATURE: 97.5 F | OXYGEN SATURATION: 94 % | RESPIRATION RATE: 16 BRPM | HEART RATE: 66 BPM | DIASTOLIC BLOOD PRESSURE: 55 MMHG | BODY MASS INDEX: 43.19 KG/M2 | WEIGHT: 243.8 LBS

## 2019-10-08 PROCEDURE — 99211 OFF/OP EST MAY X REQ PHY/QHP: CPT

## 2019-10-08 NOTE — PROGRESS NOTES
Patient: Dominique Allen MRN: [id number]  SSN: [id number]    YOB: 1945  Age: 75 y.o. Sex: female      Other Providers:    MD Ky Yuen MD    CHIEF COMPLAINT: Breast cancer    DIAGNOSIS: Grade 1 IDC of the right breast, ER/SC strongly positive, HER2 equivocal by IHC and FISH (on biopsy and surgical specimen). 1.6 cm resection specimen with 1 of 1 lymph node positive for sentinel node metastases. [id number] (stage IIA)    PREVIOUS TREATMENT:    1) 07/31/2017: Biopsy    2) 08/25/2017: lumpectomy and SLN biopsy  3) Radiation of the right breast, right Sup CLav. DOSE:  5000 cGy in 25 fractions right sup clav, 5000 cGy in 25 fractions right breast, 1000 cGy in 5 fractions right breast boost. TREATMENT DATES:  11/20/2017 - 01/04/2018  4) Arimidex, 01/2018. HISTORY OF PRESENT ILLNESS:  Dominique Allen is a 75 y.o. female initially seen by Dr. Ferdie Dance at the request of Dr. Ross Notice regarding the role of radiation therapy in further treatment of this Stage IIA right breast cancer. In 07/17 she was found to have a right breast lump with nipple inversion. She underwent diagnostic mammogram and ultrasound on 07/26/17 that demonstrated  A 2.5 cm mass at the 9 o'clock position of the right breast with nipple retraction. There were no other suspicious findings. Biopsy on 07/31/17 showed low grade invasive ductal carcinoma, ER/SC strongly positive, HER-2 equivocal by IHC and FISH. MRI of the breasts on 08/02/17 showed a 2 cm enhancing mass in the anterior right breast at the 9 o'clock position, posterior extension with a total tumor extent of 2.9 cm. No additional tumor was seen in either breast.  No significant axillary adenopathy was identified. She underwent lumpectomy and sentinel lymph node biopsy under the direction of Dr. Supriya Vargas on 13/73/44.   Pathology showed  INFILTRATING DUCT CARCINOMA WITH LOBULAR FEATURES, LOW GRADE (WELL DIFFERENTIATED) MEASURING APPROXIMATELY 1.6 X 1.5 X 1.5 CM. EXTENSIVE LYMPHOVASCULAR INVASION. AREA OF LYMPHOVASCULAR INVASION IS PRESENT LESS THAN 0.1 CM FROM MARKED SUPERIOR MARGIN. INFILTRATING TUMOR IS PRESENT APPROXIMATELY 0.3 CM FROM MARKED INFERIOR MARGIN. OTHER MARGINS ARE GREATER THAN 1 CM FROM TUMOR. SLN biopsy demonstrated MACROMETASTATIC CARCINOMA TO ONE OF ONE LYMPH NODES WITH ASSOCIATED EXTRACAPSULAR EXTENSION. Dr. Brandon Summers recommended her to testing on the surgical specimen. This returned a negative result. Her case was discussed at breast tumor board. Based on the results of  it was felt that completion axillary dissection was not required. Dr. Brandon Summers also recommended MammaPrint testing to document whether she was at low or high risk. If low risk, endocrine therapy alone would be recommended. However, if she was found to be high risk, adjuvant chemotherapy with ddAC-T would be recommended. MammaPrint did return a low-risk result and she was recommended not to undergo chemotherapy. Regardless of these results, radiation therapy was recommended. INTERVAL HISTORY: Ms Aminata Dhillon returns today 21 months after completing radiation therapy of the right breast. Ms. Aminata Dhillon continues to do well. She has no residual pain. Mammogram 10/3/19 revealed no evidence for malignancy. Tolerating Arimidex. Her energy is at her baseline. PAST MEDICAL HISTORY:    Past Medical History:   Diagnosis Date    Anxiety     BMI 40.0-44.9, adult ([address]     Breast cancer ([address] 2017    Cancer ([address] 08/2017    right breast     Depression     Diverticulosis     Essential hypertension 9/5/2018    GERD (gastroesophageal reflux disease)     zantac BID    Nausea & vomiting     post-op N/V    Osteoarthritis     Sleep apnea     c-pap     The patient denies history of collagen vascular diseases, pacemaker insertion, prior radiation or prior chemotherapy.      PAST SURGICAL HISTORY:   Past Surgical History:   Procedure Laterality Date    HX BREAST BIOPSY Left 1990's    HX BREAST BIOPSY Right 07/31/2017    US Bx    HX BREAST BIOPSY Right 8/25/2017    RIGHT BREAST NEEDLE LOCALIZED BIOPSY performed by Suhail Jarrett MD at [facility] HX BREAST LUMPECTOMY Right 8/25/2017    RIGHT BREAST LUMPECTOMY performed by Suhail Jarrett MD at [facility] HX CATARACT REMOVAL Bilateral 11/2018    HX KNEE REPLACEMENT Bilateral 2015    HX PARTIAL HYSTERECTOMY      vaginal    HX TUBAL LIGATION  1979     MEDICATIONS:     Current Outpatient Medications:     diphenhydrAMINE (BENADRYL) 25 mg capsule, Take 25 mg by mouth daily. , Disp: , Rfl:     buPROPion XL (WELLBUTRIN XL) 150 mg tablet, Take 1 Tab by mouth every morning., Disp: 30 Tab, Rfl: 11    anastrozole (ARIMIDEX) 1 mg tablet, Take 1 mg by mouth daily. , Disp: 90 Tab, Rfl: 3    sertraline (ZOLOFT) 50 mg tablet, Take 1 Tab by mouth two (2) times a day. 1/2 po every day for 2-3 days then increase by 25 mg every few days until bid, Disp: 60 Tab, Rfl: 11    hydroCHLOROthiazide (HYDRODIURIL) 25 mg tablet, Take 1 Tab by mouth daily. , Disp: 90 Tab, Rfl: 3    naltrexone-buPROPion (CONTRAVE) 8-90 mg TbER ER tablet, 2 Tab AM, 2 Tab PM, Disp: 120 Tab, Rfl: 5    telmisartan (MICARDIS) 80 mg tablet, Take 1 Tab by mouth daily. , Disp: 30 Tab, Rfl: 11    triamcinolone (ARISTOCORT) 0.5 % topical cream, Apply  to affected area two (2) times a day. use thin layer, Disp: 30 g, Rfl: 1    cetirizine (ZYRTEC) 10 mg tablet, Take 1 Tab by mouth nightly., Disp: 30 Tab, Rfl: prn    docusate sodium (STOOL SOFTENER) 100 mg tab, Take 1 Tab by mouth daily as needed. Indications: constipation, Disp: , Rfl:     cranberry extract 450 mg tab tablet, Take 450 mg by mouth daily. , Disp: , Rfl:     cpap machine kit, by Does Not Apply route. 15 cm, Disp: , Rfl:     ibuprofen (MOTRIN) 200 mg tablet, Take 800 mg by mouth every eight (8) hours as needed for Pain., Disp: , Rfl:     ranitidine (ZANTAC) 150 mg tablet, Take 150 mg by mouth two (2) times a day. Take / use AM day of surgery  per anesthesia protocols.   Indications: gastroesophageal reflux disease, Disp: , Rfl:     ALLERGIES:   Allergies   Allergen Reactions    Latex Rash     Blisters       SOCIAL HISTORY:   Social History     Socioeconomic History    Marital status:      Spouse name: Not on file    Number of children: Not on file    Years of education: Not on file    Highest education level: Not on file   Occupational History    Occupation: retired   Social Needs    Financial resource strain: Not on file    Food insecurity:     Worry: Not on file     Inability: Not on file   [place] needs:     Medical: Not on file     Non-medical: Not on file   Tobacco Use    Smoking status: Never Smoker    Smokeless tobacco: Never Used   Substance and Sexual Activity    Alcohol use: No    Drug use: No    Sexual activity: Not on file   Lifestyle    Physical activity:     Days per week: Not on file     Minutes per session: Not on file    Stress: Not on file   Relationships    Social connections:     Talks on phone: Not on file     Gets together: Not on file     Attends [employer] service: Not on file     Active member of club or organization: Not on file     Attends meetings of clubs or organizations: Not on file     Relationship status: Not on file    Intimate partner violence:     Fear of current or ex partner: Not on file     Emotionally abused: Not on file     Physically abused: Not on file     Forced sexual activity: Not on file   Other Topics Concern   [address] Service Not Asked    Blood Transfusions Not Asked    Caffeine Concern Not Asked     Comment: 3-5 cups per day    Occupational Exposure Not Asked   Migdalia Fent Hazards Not Asked    Sleep Concern Not Asked    Stress Concern Not Asked    Weight Concern Not Asked    Special Diet Not Asked    Back Care Not Asked    Exercise Yes     Comment: 1-3 days per week    Bike Helmet Not Asked    Seat Belt Not Asked    Self-Exams Not Asked   Social History Narrative    Not on file     FAMILY HISTORY:   Family History   Problem Relation Age of Onset    Depression Mother     Heart Attack Father         before age 59    Alcohol abuse Brother     Asthma Brother     Diabetes Brother     No Known Problems Brother     Breast Cancer Neg Hx        REVIEW OF SYSTEMS: Please see interval history    PHYSICAL EXAMINATION:   ECOG Performance status 0  VITAL SIGNS: Blood pressure 127/55   Pulse 66   Temperature  97.5    Weight   243.8    GENERAL: The patient is well-developed, ambulatory, alert and in no acute distress. BREASTS: The lumpectomy cavity appears well healed with good aesthetics and symmetry - unchanged. PATHOLOGY:      08/25/17:    DIAGNOSIS   A: RIGHT BREAST LUMPECTOMY: INFILTRATING DUCT CARCINOMA WITH LOBULAR FEATURES, LOW GRADE (WELL DIFFERENTIATED) MEASURING APPROXIMATELY 1.6 X 1.5 X 1.5 CM. EXTENSIVE LYMPHOVASCULAR INVASION. AREA OF LYMPHOVASCULAR INVASION IS PRESENT LESS THAN 0.1 CM FROM MARKED SUPERIOR MARGIN. INFILTRATING TUMOR IS PRESENT APPROXIMATELY 0.3 CM FROM MARKED INFERIOR MARGIN. OTHER MARGINS ARE GREATER THAN 1 CM FROM TUMOR. DEFINITE IN SITU COMPONENT IS NOT IDENTIFIED. SEE COMMENT. B: SENTINEL NODE: MACROMETASTATIC CARCINOMA TO ONE OF ONE LYMPH NODES WITH ASSOCIATED EXTRACAPSULAR EXTENSION. Comment   Infiltrating carcinoma in this specimen is similar to that in prior core biopsy [id number] which has estrogen receptors of 100%, progesterone receptors of 97%, Her-2 2+ equivocal by IHC and equivocal by FISH. Because of Her-2 studies, FISH for Her-2 will be repeated on block A5. Estrogen receptors and progesterone receptors can be repeated if clinically indicated. 07/31/17:    DIAGNOSIS   RIGHT BREAST AT 9 O'CLOCK, 2 CM FROM NIPPLE, ULTRASOUND GUIDED CORE NEEDLE BIOPSY:   INVASIVE DUCTAL CARCINOMA. ANATOMIC SITE: Right breast at 9 o'clock, 2 cm from nipple. PROCEDURE: Core needle biopsy.    HISTOLOGIC TYPE: Ductal carcinoma, NOS. SIZE: At least 1.3 cm in greatest dimension. CHITRA MODIFICATION OF BLOOM-CESPEDES GRADE:   ARCHITECTURAL SCORE: 3/3. NUCLEAR SCORE: 2/3. MITOTIC SCORE: 1/3. TOTAL SCORE: 6/9 = Grade 2/3. IN-SITU COMPONENT: Not identified. LYMPHOVASCULAR INVASION: Not identified. PERINEURAL INVASION: Present. MICROCALCIFICATIONS: Present. COLD ISCHEMIC TIME: Less than 1 minute. TOTAL TIME IN FORMALIN: 11 hours 52 minutes. BLOCK A1 SENT FOR BREAST CARCINOMA PROGNOSTIC INDICATOR ANALYSIS, SEPARATE ADDENDUM REPORT TO FOLLOW. Procedures/Addenda   [facility]- [id number] BY IHC   Status: Signed Out Casimiro Gooden MD on 2017   Interpretation   [facility] IHC Quantitative Breast Panel   TEST NAME: RESULTS: INTERNAL CONTROLS:   ESTROGEN RECEPTOR: Positive (99.8%) Present, Positive   PROGESTERONE RECEPTOR: Positive (96.6%) Present, Positive   HER-2/VLADISLAV: Equivocal (2+) Percentage of Cells with Uniform   Intense Complete Membrane   Stainin%   FISH Her-2/vladislav has been ordered and will be reported in an addendum. See full report in [facility]. [facility] - [id number] BY FISH   Status: Signed Out Faraz Barker MD on 8/15/2017   Interpretation   [facility] FISH Analysis/HER2 Breast:   RESULTS: Equivocal   See full report in [facility]. [facility] - [id number] BY FISH   Status: Signed Out Juwan Baltazar M.D., Ph.D. on 10/5/2017   Interpretation   [facility] HER2 Breast Equivocal FISH Panel Result:   Negative. See full report in [facility].      LABORATORY:   Lab Results   Component Value Date/Time    Sodium 144 2019 02:37 PM    Potassium 3.6 2019 02:37 PM    Chloride 110 (H) 2019 02:37 PM    CO2 27 2019 02:37 PM    Anion gap 7 2019 02:37 PM    Glucose 121 (H) 2019 02:37 PM    BUN 22 2019 02:37 PM    Creatinine 0.94 2019 02:37 PM    GFR est AA >60 2019 02:37 PM    GFR est non-AA >60 2019 02:37 PM    Calcium 9.3 08/19/2019 02:37 PM    Albumin 3.4 08/19/2019 02:37 PM    Protein, total 7.2 08/19/2019 02:37 PM    Globulin 3.8 (H) 08/19/2019 02:37 PM    A-G Ratio 0.9 (L) 08/19/2019 02:37 PM    AST (SGOT) 16 08/19/2019 02:37 PM    ALT (SGPT) 28 08/19/2019 02:37 PM     Lab Results   Component Value Date/Time    WBC 7.2 08/19/2019 02:37 PM    HGB 11.8 08/19/2019 02:37 PM    HCT 36.5 08/19/2019 02:37 PM    PLATELET 897 78/98/1646 02:37 PM       RADIOLOGY:      08/02/17: MRI of the Breasts     INDICATION:  New diagnosis of breast cancer     Standard MRI sequences were obtained through the breasts in multiple planes. Images were obtained before and after intravenous infusion of 20 ml of  Multihance.      FINDINGS:  There is an irregular enhancing 2.2 cm mass at 9:00 in the right breast  consistent with known breast tumor. The mass measures 2.2 cm in diameter. There is a small focus of enhancement immediately posterior to the mass in the  lateral right breast, likely local extension of tumor. Total extent is 2.9 cm. No additional tumor is seen in the right breast.  There is no significant right  axillary adenopathy.     There is mild background enhancement of the left breast.  There is no  significant left axillary adenopathy.   There are no chest wall lesions.     IMPRESSION:   1.  2 cm enhancing mass in the anterior right breast at 9:00, posterior  extension with a total tumor extent of 2.9 cm.  2.  No additional tumor in either breast.     BI-RADS Assessment Category 6: Known Biopsy Proven Malignancy      07/26/17: DIAGNOSTIC DIGITAL BILATERAL MAMMOGRAPHY AND BILATERAL BREAST ULTRASOUND      CLINICAL INDICATION: Patient reports approximately one week of right palpable  lump with skin thickening and nipple inversion, remote left benign excision     COMPARISON: 3/23/2009 from [facility] [address]:      Mammogram: Digital diagnostic mammography was performed, and is interpreted in  conjunction with a computer assisted detection (CAD) system.       Standard views bilaterally demonstrate scattered glandular densities. Additional  bilateral mediolateral and compression magnification views were performed. On  the right there is an irregular mass in the anterior slightly lateral breast  corresponding to the area of symptoms. On the left there is a small oval  circumscribed mass anteriorly at 3:00 new since prior. Ultrasound is recommended  to further evaluate these areas.     Otherwise there are scattered bilateral typically benign calcifications with no  suspicious cluster. There is no skin thickening or distortion on the left. Circumscribed 1.7 cm fibroadenoma in the right anterior slightly medial breast  is unchanged.     Ultrasound: Real time targeted sonography was performed of the bilateral breast  by the radiologist and sonographer. Corresponding to the mammographic finding on  the left at 3:00 3 cm from the nipple is a 0.9 cm benign cyst. Corresponding to  the area of symptoms on the right at 9:00 2 cm from the nipple is an irregular  heterogeneously hypoechoic 2.5 x 1.7 x 1.4 cm mass with posterior shadowing and  no definite Doppler flow. The right axilla demonstrates no obvious  lymphadenopathy.     IMPRESSION:   1. Right 9:00 palpable 2.5 cm mass, with nipple retraction. Recommend  ultrasound-guided biopsy. 2. No other suspicious finding on either side. Benign left 3:00 small cyst  noted. DIAGNOSTIC DIGITAL BILATERAL MAMMOGRAPHY  07/27/2018     CLINICAL INDICATION: Surveillance, patient had right lumpectomy on 8/25/2017 for  9:00 breast cancer and right axillary lymph node removal demonstrating  macrometastatic carcinoma. Subsequent radiation and hormone therapy. Patient  with no new problems today; maternal aunt with breast cancer age 76.     COMPARISON: 8/25/2017 and others going back to 3/23/2009.  Also breast MRI  3/2/2017.      FINDINGS:      Mammogram: Digital diagnostic mammography was performed, and is interpreted in  conjunction with a computer assisted detection (CAD) system.       Standard views bilaterally demonstrate scattered glandular densities. Additional  right mediolateral and compression magnification views were performed. On the  left are stable small circumscribed nodules as well as scattered typically  benign calcifications. On the right there is mild expected postoperative  scarring and distortion, minimal scattered typically benign calcifications. No  suspicious developing mass, calcification cluster, nonsurgical distortion or  undue skin thickening is seen on either side.     IMPRESSION: Benign right lumpectomy change. No evidence of malignancy in either  breast.     Recommend bilateral screening mammography in one year unless other imaging is  clinically warranted in the interval. A reminder letter will be scheduled. This  was reported to the patient at the time of interpretation.     BI-RADS Assessment Category 2: Benign finding    BILATERAL DIAGNOSTIC DIGITAL MAMMOGRAPHY  10/3/2019     CLINICAL HISTORY:  Followup right lumpectomy.      COMPARISON:   Multiple prior studies dating from July 26, 2017.     FINDINGS:  Right lumpectomy bed appears unchanged. There is scattered  fibroglandular tissue in a fairly symmetric pattern elsewhere bilaterally. Scattered typically benign left breast calcifications, nodularity, and small  axillary tail lymph nodes are again noted. No new or enlarging soft tissue mass,  dominant cluster of suspicious microcalcifications, or other definite evidence  for malignancy is seen. No significant change is seen from prior study.     IMPRESSION:          STABLE POST-TREATMENT APPEARANCE OF THE RIGHT BREAST WITH NO SPECIFIC  MAMMOGRAPHIC EVIDENCE FOR MALIGNANCY ON EITHER SIDE. FOLLOW-UP BILATERAL  SCREENING MAMMOGRAPHY IS RECOMMENDED IN ONE YEAR.       IMPRESSION:  Marni Hoffman is a 75 y.o. female with Grade 1 IDC of the right breast, ER/NC strongly positive, HER2 equivocal by IHC and FISH (on biopsy and surgical specimen). 1.6 cm resection specimen with 1 of 1 lymph node positive for sentinel node metastases. [id number] (stage IIA). Her oncotype has returned at a score was low. It was determined by Dr. Umair Billy, her oncologist, that she would not need chemotherapy. She completed radiation therapy of the right breast, 01/04/2018. She was started on Arimidex after completing radiation therapy under the direction of Dr. Umair Billy. Dexa scan in December 2017 shows osteopenia. She was started on calcium and vitamin D supplementation at that time. Ms Cody Graham is now 21 months out from completing radiation therapy. At this time, Ms Cody Graham denies any residual side effects related to radiation. Mammogram done 10/3/2019 revealed no evidence for malignancy. Endocrine therapy managed by medical oncology. There is no further intervention from a radiation standpoint needed at this time. To continue close followup with medical oncology. We are available if needed. I spent 25 minutes in the care of Ms Cody Graham today, over 50% of which was in direct counseling and ongoing management of her IDC breast cancer. Belem Brito NP   October 8, 2019      Portions of this note were copied from prior encounters and reviewed for accuracy, currency, and represent documentation and tasks completed during this encounter. I verify and attest these portions to be unchanged from prior visits.

## 2019-10-08 NOTE — PROGRESS NOTES
6 Month Follow Up Right Breast  NP Med Onc - 02/18/2020    Patient is currently taking Arimidex    Bone Density (12/13/2017):   -Osteopenia  -Patient is not currently taking Calcium and Vitamin D  -ordered not scheduled    Mammogram (10/03/2019): (-)    08/25/2017 - Lumpectomy    RT End - 01/04/2018      Placido Quintana CMA

## 2020-07-21 ENCOUNTER — HOSPITAL ENCOUNTER (OUTPATIENT)
Dept: LAB | Age: 75
Discharge: HOME | End: 2020-07-21
Payer: MEDICARE

## 2020-07-21 DIAGNOSIS — C50.511 MALIGNANT NEOPLASM OF LOWER-OUTER QUADRANT OF RIGHT BREAST OF FEMALE, ESTROGEN RECEPTOR POSITIVE (HCC): ICD-10-CM

## 2020-07-21 DIAGNOSIS — Z17.0 MALIGNANT NEOPLASM OF LOWER-OUTER QUADRANT OF RIGHT BREAST OF FEMALE, ESTROGEN RECEPTOR POSITIVE (HCC): ICD-10-CM

## 2020-07-21 LAB
ALBUMIN SERPL-MCNC: 3.4 G/DL (ref 3.2–4.6)
ALBUMIN/GLOB SERPL: 1 {RATIO} (ref 1.2–3.5)
ALP SERPL-CCNC: 88 U/L (ref 50–136)
ALT SERPL-CCNC: 24 U/L (ref 12–65)
ANION GAP SERPL CALC-SCNC: 4 MMOL/L (ref 7–16)
AST SERPL-CCNC: 19 U/L (ref 15–37)
BASOPHILS # BLD: 0.1 K/UL (ref 0–0.2)
BASOPHILS NFR BLD: 1 % (ref 0–2)
BILIRUB SERPL-MCNC: 0.3 MG/DL (ref 0.2–1.1)
BUN SERPL-MCNC: 18 MG/DL (ref 8–23)
CALCIUM SERPL-MCNC: 8.8 MG/DL (ref 8.3–10.4)
CHLORIDE SERPL-SCNC: 110 MMOL/L (ref 98–107)
CO2 SERPL-SCNC: 28 MMOL/L (ref 21–32)
CREAT SERPL-MCNC: 0.96 MG/DL (ref 0.6–1)
DIFFERENTIAL METHOD BLD: ABNORMAL
EOSINOPHIL # BLD: 0.2 K/UL (ref 0–0.8)
EOSINOPHIL NFR BLD: 3 % (ref 0.5–7.8)
ERYTHROCYTE [DISTWIDTH] IN BLOOD BY AUTOMATED COUNT: 14 % (ref 11.9–14.6)
GLOBULIN SER CALC-MCNC: 3.5 G/DL (ref 2.3–3.5)
GLUCOSE SERPL-MCNC: 105 MG/DL (ref 65–100)
HCT VFR BLD AUTO: 37.4 % (ref 35.8–46.3)
HGB BLD-MCNC: 12 G/DL (ref 11.7–15.4)
IMM GRANULOCYTES # BLD AUTO: 0 K/UL (ref 0–0.5)
IMM GRANULOCYTES NFR BLD AUTO: 0 % (ref 0–5)
LYMPHOCYTES # BLD: 1 K/UL (ref 0.5–4.6)
LYMPHOCYTES NFR BLD: 15 % (ref 13–44)
MCH RBC QN AUTO: 30.4 PG (ref 26.1–32.9)
MCHC RBC AUTO-ENTMCNC: 32.1 G/DL (ref 31.4–35)
MCV RBC AUTO: 94.7 FL (ref 79.6–97.8)
MONOCYTES # BLD: 0.5 K/UL (ref 0.1–1.3)
MONOCYTES NFR BLD: 8 % (ref 4–12)
NEUTS SEG # BLD: 4.6 K/UL (ref 1.7–8.2)
NEUTS SEG NFR BLD: 73 % (ref 43–78)
NRBC # BLD: 0 K/UL (ref 0–0.2)
PLATELET # BLD AUTO: 212 K/UL (ref 150–450)
PMV BLD AUTO: 10.2 FL (ref 9.4–12.3)
POTASSIUM SERPL-SCNC: 3.7 MMOL/L (ref 3.5–5.1)
PROT SERPL-MCNC: 6.9 G/DL (ref 6.3–8.2)
RBC # BLD AUTO: 3.95 M/UL (ref 4.05–5.2)
SODIUM SERPL-SCNC: 142 MMOL/L (ref 136–145)
WBC # BLD AUTO: 6.3 K/UL (ref 4.3–11.1)

## 2020-07-21 PROCEDURE — 36415 COLL VENOUS BLD VENIPUNCTURE: CPT

## 2020-07-21 PROCEDURE — 85025 COMPLETE CBC W/AUTO DIFF WBC: CPT

## 2020-07-21 PROCEDURE — 80053 COMPREHEN METABOLIC PANEL: CPT

## (undated) DEVICE — CLIP LIG ADH PD HORZ MED BLU --

## (undated) DEVICE — BUTTON SWITCH PENCIL BLADE ELECTRODE, HOLSTER: Brand: EDGE

## (undated) DEVICE — (D)SYR 10ML 1/5ML GRAD NSAF -- PKGING CHANGE USE ITEM 338027

## (undated) DEVICE — WIRE CUTTER, STERILE (WCS144): Brand: CENTURION MEDICAL PRODUCTS CORP

## (undated) DEVICE — REM POLYHESIVE ADULT PATIENT RETURN ELECTRODE: Brand: VALLEYLAB

## (undated) DEVICE — SURGICAL PROCEDURE PACK BASIC ST FRANCIS

## (undated) DEVICE — 2000CC GUARDIAN II: Brand: GUARDIAN

## (undated) DEVICE — APPLIER CLP SM BLK TI AUTO HNDL DISP W/ 20 CLP PREM SURGICLP

## (undated) DEVICE — COVER PRB L11.9CM TAPR L3.8X61CM TRNSPAR SFT PLIABLE

## (undated) DEVICE — NEEDLE HYPO 21GA L1.5IN INTRAMUSCULAR S STL LATCH BVL UP

## (undated) DEVICE — SOLUTION IV 1000ML 0.9% SOD CHL

## (undated) DEVICE — MEDI-VAC NON-CONDUCTIVE SUCTION TUBING: Brand: CARDINAL HEALTH

## (undated) DEVICE — SUTURE MCRYL SZ 4-0 L27IN ABSRB UD L19MM PS-2 1/2 CIR PRIM Y426H

## (undated) DEVICE — DRAPE,TOP,102X53,STERILE: Brand: MEDLINE

## (undated) DEVICE — (D)PREP SKN CHLRAPRP APPL 26ML -- CONVERT TO ITEM 371833

## (undated) DEVICE — GAUZE,SPONGE,8"X4",12PLY,XRAY,STRL,LF: Brand: MEDLINE

## (undated) DEVICE — SUTURE PERMAHAND SZ 2-0 L18IN NONABSORBABLE BLK L26MM SH C012D

## (undated) DEVICE — SHEET, DRAPE, SPLIT, STERILE: Brand: MEDLINE

## (undated) DEVICE — HEX-LOCKING BLADE ELECTRODE: Brand: EDGE

## (undated) DEVICE — SUTURE MCRYL SZ 4-0 L18IN ABSRB UD L19MM PS-2 3/8 CIR PRIM Y496G

## (undated) DEVICE — CONTAINER,SPECIMEN,O.R.STRL,4.5OZ: Brand: MEDLINE

## (undated) DEVICE — HYPODERMIC SAFETY NEEDLE: Brand: MAGELLAN

## (undated) DEVICE — MEDI-VAC YANKAUER SUCTION HANDLE W/BULBOUS TIP: Brand: CARDINAL HEALTH

## (undated) DEVICE — MASTISOL ADHESIVE LIQ 2/3ML

## (undated) DEVICE — CONTAINER COLL/TRNSPRT BX BRST -- E-Z-EM CAT 8390

## (undated) DEVICE — (D)STRIP SKN CLSR 0.5X4IN WHT --

## (undated) DEVICE — SUTURE VCRL SZ 3-0 L27IN ABSRB UD L26MM SH 1/2 CIR J416H

## (undated) DEVICE — CONTAINER SPEC COLLCTN 8 OZ W/ CAP PLAS STRL